# Patient Record
Sex: MALE | Race: WHITE | URBAN - METROPOLITAN AREA
[De-identification: names, ages, dates, MRNs, and addresses within clinical notes are randomized per-mention and may not be internally consistent; named-entity substitution may affect disease eponyms.]

---

## 2018-07-11 ENCOUNTER — EMERGENCY (EMERGENCY)
Facility: HOSPITAL | Age: 58
LOS: 0 days | Discharge: HOME | End: 2018-07-12
Attending: EMERGENCY MEDICINE | Admitting: EMERGENCY MEDICINE

## 2018-07-11 VITALS
HEART RATE: 79 BPM | OXYGEN SATURATION: 98 % | RESPIRATION RATE: 20 BRPM | TEMPERATURE: 97 F | DIASTOLIC BLOOD PRESSURE: 74 MMHG | SYSTOLIC BLOOD PRESSURE: 126 MMHG
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
## 2018-07-11 DIAGNOSIS — I25.10 ATHEROSCLEROTIC HEART DISEASE OF NATIVE CORONARY ARTERY WITHOUT ANGINA PECTORIS: ICD-10-CM

## 2018-07-11 DIAGNOSIS — I25.2 OLD MYOCARDIAL INFARCTION: ICD-10-CM

## 2018-07-11 DIAGNOSIS — R07.89 OTHER CHEST PAIN: ICD-10-CM

## 2018-07-11 DIAGNOSIS — Z21 ASYMPTOMATIC HUMAN IMMUNODEFICIENCY VIRUS [HIV] INFECTION STATUS: ICD-10-CM
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
## 2018-07-11 DIAGNOSIS — I10 ESSENTIAL (PRIMARY) HYPERTENSION: ICD-10-CM

## 2018-07-11 DIAGNOSIS — E78.00 PURE HYPERCHOLESTEROLEMIA, UNSPECIFIED: ICD-10-CM
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
## 2018-07-11 DIAGNOSIS — F17.200 NICOTINE DEPENDENCE, UNSPECIFIED, UNCOMPLICATED: ICD-10-CM
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
## 2018-07-11 LAB
APTT BLD: 31.6 SEC — SIGNIFICANT CHANGE UP (ref 27–39.2)
BASOPHILS # BLD AUTO: 0.05 K/UL — SIGNIFICANT CHANGE UP (ref 0–0.2)
BASOPHILS NFR BLD AUTO: 0.6 % — SIGNIFICANT CHANGE UP (ref 0–1)
CK MB CFR SERPL CALC: 2.1 NG/ML — SIGNIFICANT CHANGE UP (ref 0.6–6.3)
EOSINOPHIL # BLD AUTO: 0.1 K/UL — SIGNIFICANT CHANGE UP (ref 0–0.7)
EOSINOPHIL NFR BLD AUTO: 1.2 % — SIGNIFICANT CHANGE UP (ref 0–8)
HCT VFR BLD CALC: 40.3 % — LOW (ref 42–52)
HGB BLD-MCNC: 14.6 G/DL — SIGNIFICANT CHANGE UP (ref 14–18)
IMM GRANULOCYTES NFR BLD AUTO: 0.4 % — HIGH (ref 0.1–0.3)
INR BLD: 1.11 RATIO — SIGNIFICANT CHANGE UP (ref 0.65–1.3)
LYMPHOCYTES # BLD AUTO: 1.77 K/UL — SIGNIFICANT CHANGE UP (ref 1.2–3.4)
LYMPHOCYTES # BLD AUTO: 21.3 % — SIGNIFICANT CHANGE UP (ref 20.5–51.1)
MCHC RBC-ENTMCNC: 33 PG — HIGH (ref 27–31)
MCHC RBC-ENTMCNC: 36.2 G/DL — SIGNIFICANT CHANGE UP (ref 32–37)
MCV RBC AUTO: 91.2 FL — SIGNIFICANT CHANGE UP (ref 80–94)
MONOCYTES # BLD AUTO: 0.53 K/UL — SIGNIFICANT CHANGE UP (ref 0.1–0.6)
MONOCYTES NFR BLD AUTO: 6.4 % — SIGNIFICANT CHANGE UP (ref 1.7–9.3)
NEUTROPHILS # BLD AUTO: 5.84 K/UL — SIGNIFICANT CHANGE UP (ref 1.4–6.5)
NEUTROPHILS NFR BLD AUTO: 70.1 % — SIGNIFICANT CHANGE UP (ref 42.2–75.2)
NRBC # BLD: 0 /100 WBCS — SIGNIFICANT CHANGE UP (ref 0–0)
PLATELET # BLD AUTO: 161 K/UL — SIGNIFICANT CHANGE UP (ref 130–400)
PROTHROM AB SERPL-ACNC: 12 SEC — SIGNIFICANT CHANGE UP (ref 9.95–12.87)
RBC # BLD: 4.42 M/UL — LOW (ref 4.7–6.1)
RBC # FLD: 13 % — SIGNIFICANT CHANGE UP (ref 11.5–14.5)
TROPONIN T SERPL-MCNC: 0.01 NG/ML — SIGNIFICANT CHANGE UP
WBC # BLD: 8.32 K/UL — SIGNIFICANT CHANGE UP (ref 4.8–10.8)
WBC # FLD AUTO: 8.32 K/UL — SIGNIFICANT CHANGE UP (ref 4.8–10.8)

## 2018-07-11 NOTE — ED PROVIDER NOTE - PROGRESS NOTE DETAILS
pt feels well and wishes to leave because he has to prepare for a gathering v. staying here for admission and continued workup.  pt aware that without two troponins, I am unable to ascertain if he had an MI or any cardiac damage.  Pt will f/u with his PCP and is encouraged to return for any continued pain.

## 2018-07-11 NOTE — ED PROVIDER NOTE - NS ED ROS FT
Review of Systems:  · CONSTITUTIONAL: no fever and no chills.  · EYES: no discharge, no irritation, no pain, no redness, and no visual changes.  · ENMT: Ears: no ear pain and no hearing problems. Nose: no nasal congestion and no nasal drainage. Mouth/Throat: no dysphagia, no hoarseness and no throat pain.  · CARDIOVASCULAR: + chest pain  · RESPIRATORY: no cough, and no shortness of breath.  · GASTROINTESTINAL: no abdominal pain, no diarrhea, no nausea and no vomiting.  · GENITOURINARY: no dysuria  · MUSCULOSKELETAL: no back pain, no musculoskeletal pain, no weakness.  · SKIN: no rashes.  · NEURO: no loss of consciousness, no headache.  · ROS STATEMENT: all other ROS negative except as per HPI

## 2018-07-11 NOTE — ED PROVIDER NOTE - OBJECTIVE STATEMENT
57 y/o M with h/o CAD and MI x 3 here with chest tightness that felt the same as his prior MIs.  Felt squeezing around his chest wall.  No associated sob, n/v, diaphoresis.  has been doing heavy lifting and moving the past 2 days.  Pt started about 2 hrs PTA and lasted for about 1 hr.  PMH htn, cad, hchol, hiv.  Pt does not f/u with cardiologist.  Pt did take 2 regular strength asa as precaution the last two days.  No ap. No leg pain or swelling. No cough, fever.

## 2018-07-11 NOTE — ED PROVIDER NOTE - PHYSICAL EXAMINATION
Physical Examination:   VITAL SIGNS: I have reviewed vital signs.  CONSTITUTIONAL: well appearing, NAD.   SKIN: Skin exam is warm and dry.  No rashes  HEAD: Normocephalic; atraumatic.  EYES: PERRL, EOM intact; conjunctiva and sclera clear.  ENT: No nasal discharge; airway clear.  CARD: S1, S2 reg  RESP: CTAB. No wheezes, rales or rhonchi.  ABD: soft; non-distended; non-tender  EXT: Normal ROM. No edema.  NEURO: Alert, oriented. Grossly unremarkable. No focal deficits. Ambulatory with steady gait.  PSYCH: Cooperative, appropriate.

## 2018-07-11 NOTE — ED PROVIDER NOTE - PMH
CAD (coronary artery disease)    High cholesterol    HIV (human immunodeficiency virus infection)    HTN (hypertension)

## 2018-07-11 NOTE — ED PROVIDER NOTE - MEDICAL DECISION MAKING DETAILS
The patient has decided to leave against medical advice.  The patient is AAOx3, not intoxicated, and displays normal decision making ability. We discussed all risks, benefits, and alternatives to the progression of treatment and the potential dangers of leaving including but not limited to permanent disability, injury, and death.  The patient was instructed that they are welcome to change their decision to leave against medical advice and return to the emergency department at any time and for any reason in order to allow us to render care.

## 2018-07-11 NOTE — ED ADULT TRIAGE NOTE - CHIEF COMPLAINT QUOTE
tightness of chest started over an hour ago, patient was doing yard work today states he has 3 heart attacks in the past

## 2018-07-12 LAB
ALBUMIN SERPL ELPH-MCNC: 4.7 G/DL — SIGNIFICANT CHANGE UP (ref 3.5–5.2)
ALP SERPL-CCNC: 93 U/L — SIGNIFICANT CHANGE UP (ref 30–115)
ALT FLD-CCNC: 15 U/L — SIGNIFICANT CHANGE UP (ref 0–41)
ANION GAP SERPL CALC-SCNC: 18 MMOL/L — HIGH (ref 7–14)
AST SERPL-CCNC: 21 U/L — SIGNIFICANT CHANGE UP (ref 0–41)
BILIRUB SERPL-MCNC: 0.3 MG/DL — SIGNIFICANT CHANGE UP (ref 0.2–1.2)
BUN SERPL-MCNC: 17 MG/DL — SIGNIFICANT CHANGE UP (ref 10–20)
CALCIUM SERPL-MCNC: 9 MG/DL — SIGNIFICANT CHANGE UP (ref 8.5–10.1)
CHLORIDE SERPL-SCNC: 103 MMOL/L — SIGNIFICANT CHANGE UP (ref 98–110)
CHOLEST SERPL-MCNC: 92 MG/DL — LOW (ref 100–200)
CK SERPL-CCNC: 60 U/L — SIGNIFICANT CHANGE UP (ref 0–225)
CO2 SERPL-SCNC: 19 MMOL/L — SIGNIFICANT CHANGE UP (ref 17–32)
CREAT SERPL-MCNC: 1.1 MG/DL — SIGNIFICANT CHANGE UP (ref 0.7–1.5)
GLUCOSE SERPL-MCNC: 124 MG/DL — HIGH (ref 70–99)
POTASSIUM SERPL-MCNC: 4.6 MMOL/L — SIGNIFICANT CHANGE UP (ref 3.5–5)
POTASSIUM SERPL-SCNC: 4.6 MMOL/L — SIGNIFICANT CHANGE UP (ref 3.5–5)
PROT SERPL-MCNC: 7.3 G/DL — SIGNIFICANT CHANGE UP (ref 6–8)
SODIUM SERPL-SCNC: 140 MMOL/L — SIGNIFICANT CHANGE UP (ref 135–146)

## 2018-07-12 NOTE — ED ADULT NURSE REASSESSMENT NOTE - NS ED NURSE REASSESS COMMENT FT1
0100 pt wants to sign out ama spoken to at length by renato antony. paperwork provided. pt verbalized understanding

## 2021-11-01 NOTE — ED ADULT NURSE NOTE - TEMPLATE LIST FOR HEAD TO TOE ASSESSMENT
Lyrica Denied     Formulary reason  Prescription Drug Insurance: [employer]  Denial reason:       Appeal information:       Please reply to telephone encounter on how to proceed with medication authorization.         Cardiac

## 2022-08-10 ENCOUNTER — INPATIENT (INPATIENT)
Facility: HOSPITAL | Age: 62
LOS: 4 days | Discharge: HOME | End: 2022-08-15
Attending: STUDENT IN AN ORGANIZED HEALTH CARE EDUCATION/TRAINING PROGRAM | Admitting: STUDENT IN AN ORGANIZED HEALTH CARE EDUCATION/TRAINING PROGRAM

## 2022-08-10 VITALS
HEART RATE: 95 BPM | OXYGEN SATURATION: 97 % | TEMPERATURE: 99 F | SYSTOLIC BLOOD PRESSURE: 130 MMHG | RESPIRATION RATE: 20 BRPM | WEIGHT: 160.06 LBS | DIASTOLIC BLOOD PRESSURE: 62 MMHG

## 2022-08-10 PROBLEM — I25.10 ATHEROSCLEROTIC HEART DISEASE OF NATIVE CORONARY ARTERY WITHOUT ANGINA PECTORIS: Chronic | Status: ACTIVE | Noted: 2018-07-11

## 2022-08-10 PROBLEM — I10 ESSENTIAL (PRIMARY) HYPERTENSION: Chronic | Status: ACTIVE | Noted: 2018-07-11

## 2022-08-10 PROBLEM — B20 HUMAN IMMUNODEFICIENCY VIRUS [HIV] DISEASE: Chronic | Status: ACTIVE | Noted: 2018-07-11

## 2022-08-10 PROBLEM — E78.00 PURE HYPERCHOLESTEROLEMIA, UNSPECIFIED: Chronic | Status: ACTIVE | Noted: 2018-07-11

## 2022-08-10 LAB
ALBUMIN SERPL ELPH-MCNC: 4.9 G/DL — SIGNIFICANT CHANGE UP (ref 3.5–5.2)
ALP SERPL-CCNC: 112 U/L — SIGNIFICANT CHANGE UP (ref 30–115)
ALT FLD-CCNC: 19 U/L — SIGNIFICANT CHANGE UP (ref 0–41)
ANION GAP SERPL CALC-SCNC: 13 MMOL/L — SIGNIFICANT CHANGE UP (ref 7–14)
AST SERPL-CCNC: 27 U/L — SIGNIFICANT CHANGE UP (ref 0–41)
BASOPHILS # BLD AUTO: 0.04 K/UL — SIGNIFICANT CHANGE UP (ref 0–0.2)
BASOPHILS NFR BLD AUTO: 1 % — SIGNIFICANT CHANGE UP (ref 0–1)
BILIRUB SERPL-MCNC: 0.5 MG/DL — SIGNIFICANT CHANGE UP (ref 0.2–1.2)
BUN SERPL-MCNC: 17 MG/DL — SIGNIFICANT CHANGE UP (ref 10–20)
CALCIUM SERPL-MCNC: 9.5 MG/DL — SIGNIFICANT CHANGE UP (ref 8.5–10.1)
CHLORIDE SERPL-SCNC: 104 MMOL/L — SIGNIFICANT CHANGE UP (ref 98–110)
CO2 SERPL-SCNC: 23 MMOL/L — SIGNIFICANT CHANGE UP (ref 17–32)
CREAT SERPL-MCNC: 1.1 MG/DL — SIGNIFICANT CHANGE UP (ref 0.7–1.5)
EGFR: 75 ML/MIN/1.73M2 — SIGNIFICANT CHANGE UP
EOSINOPHIL # BLD AUTO: 0.17 K/UL — SIGNIFICANT CHANGE UP (ref 0–0.7)
EOSINOPHIL NFR BLD AUTO: 4.1 % — SIGNIFICANT CHANGE UP (ref 0–8)
FLUAV AG NPH QL: SIGNIFICANT CHANGE UP
FLUBV AG NPH QL: SIGNIFICANT CHANGE UP
GLUCOSE SERPL-MCNC: 113 MG/DL — HIGH (ref 70–99)
HCT VFR BLD CALC: 41.1 % — LOW (ref 42–52)
HGB BLD-MCNC: 14.6 G/DL — SIGNIFICANT CHANGE UP (ref 14–18)
IMM GRANULOCYTES NFR BLD AUTO: 0 % — LOW (ref 0.1–0.3)
LACTATE SERPL-SCNC: 1.2 MMOL/L — SIGNIFICANT CHANGE UP (ref 0.7–2)
LYMPHOCYTES # BLD AUTO: 1.3 K/UL — SIGNIFICANT CHANGE UP (ref 1.2–3.4)
LYMPHOCYTES # BLD AUTO: 31.6 % — SIGNIFICANT CHANGE UP (ref 20.5–51.1)
MCHC RBC-ENTMCNC: 33.8 PG — HIGH (ref 27–31)
MCHC RBC-ENTMCNC: 35.5 G/DL — SIGNIFICANT CHANGE UP (ref 32–37)
MCV RBC AUTO: 95.1 FL — HIGH (ref 80–94)
MONOCYTES # BLD AUTO: 0.61 K/UL — HIGH (ref 0.1–0.6)
MONOCYTES NFR BLD AUTO: 14.8 % — HIGH (ref 1.7–9.3)
NEUTROPHILS # BLD AUTO: 2 K/UL — SIGNIFICANT CHANGE UP (ref 1.4–6.5)
NEUTROPHILS NFR BLD AUTO: 48.5 % — SIGNIFICANT CHANGE UP (ref 42.2–75.2)
NRBC # BLD: 0 /100 WBCS — SIGNIFICANT CHANGE UP (ref 0–0)
NT-PROBNP SERPL-SCNC: 85 PG/ML — SIGNIFICANT CHANGE UP (ref 0–300)
PLATELET # BLD AUTO: 157 K/UL — SIGNIFICANT CHANGE UP (ref 130–400)
POTASSIUM SERPL-MCNC: 4.8 MMOL/L — SIGNIFICANT CHANGE UP (ref 3.5–5)
POTASSIUM SERPL-SCNC: 4.8 MMOL/L — SIGNIFICANT CHANGE UP (ref 3.5–5)
PROT SERPL-MCNC: 7.9 G/DL — SIGNIFICANT CHANGE UP (ref 6–8)
RBC # BLD: 4.32 M/UL — LOW (ref 4.7–6.1)
RBC # FLD: 13.7 % — SIGNIFICANT CHANGE UP (ref 11.5–14.5)
RSV RNA NPH QL NAA+NON-PROBE: SIGNIFICANT CHANGE UP
SARS-COV-2 RNA SPEC QL NAA+PROBE: DETECTED
SODIUM SERPL-SCNC: 140 MMOL/L — SIGNIFICANT CHANGE UP (ref 135–146)
TROPONIN T SERPL-MCNC: <0.01 NG/ML — SIGNIFICANT CHANGE UP
WBC # BLD: 4.12 K/UL — LOW (ref 4.8–10.8)
WBC # FLD AUTO: 4.12 K/UL — LOW (ref 4.8–10.8)

## 2022-08-10 PROCEDURE — 93010 ELECTROCARDIOGRAM REPORT: CPT

## 2022-08-10 PROCEDURE — 71045 X-RAY EXAM CHEST 1 VIEW: CPT | Mod: 26

## 2022-08-10 PROCEDURE — 99285 EMERGENCY DEPT VISIT HI MDM: CPT

## 2022-08-10 RX ORDER — EMTRICITABINE AND TENOFOVIR DISOPROXIL FUMARATE 200; 300 MG/1; MG/1
1 TABLET, FILM COATED ORAL DAILY
Refills: 0 | Status: DISCONTINUED | OUTPATIENT
Start: 2022-08-10 | End: 2022-08-15

## 2022-08-10 RX ORDER — ENOXAPARIN SODIUM 100 MG/ML
40 INJECTION SUBCUTANEOUS EVERY 24 HOURS
Refills: 0 | Status: DISCONTINUED | OUTPATIENT
Start: 2022-08-10 | End: 2022-08-15

## 2022-08-10 RX ORDER — ATORVASTATIN CALCIUM 80 MG/1
1 TABLET, FILM COATED ORAL
Qty: 0 | Refills: 0 | DISCHARGE

## 2022-08-10 RX ORDER — TENOFOVIR DISOPROXIL FUMARATE 300 MG/1
300 TABLET, FILM COATED ORAL DAILY
Refills: 0 | Status: DISCONTINUED | OUTPATIENT
Start: 2022-08-10 | End: 2022-08-10

## 2022-08-10 RX ORDER — NICOTINE POLACRILEX 2 MG
1 GUM BUCCAL DAILY
Refills: 0 | Status: DISCONTINUED | OUTPATIENT
Start: 2022-08-10 | End: 2022-08-15

## 2022-08-10 RX ORDER — EMTRICITABINE, RILPIVIRINE HYDROCHLORIDE, AND TENOFOVIR DISOPROXIL FUMARATE 200; 25; 300 MG/1; MG/1; MG/1
1 TABLET, FILM COATED ORAL
Refills: 0 | Status: DISCONTINUED | OUTPATIENT
Start: 2022-08-10 | End: 2022-08-10

## 2022-08-10 RX ORDER — ASPIRIN/CALCIUM CARB/MAGNESIUM 324 MG
81 TABLET ORAL DAILY
Refills: 0 | Status: DISCONTINUED | OUTPATIENT
Start: 2022-08-10 | End: 2022-08-15

## 2022-08-10 RX ORDER — LISINOPRIL 2.5 MG/1
1 TABLET ORAL
Qty: 0 | Refills: 0 | DISCHARGE

## 2022-08-10 RX ORDER — RILPIVIRINE HYDROCHLORIDE 25 MG/1
25 TABLET, FILM COATED ORAL
Refills: 0 | Status: DISCONTINUED | OUTPATIENT
Start: 2022-08-10 | End: 2022-08-15

## 2022-08-10 RX ORDER — ASPIRIN/CALCIUM CARB/MAGNESIUM 324 MG
1 TABLET ORAL
Qty: 0 | Refills: 0 | DISCHARGE

## 2022-08-10 RX ORDER — LISINOPRIL 2.5 MG/1
10 TABLET ORAL DAILY
Refills: 0 | Status: DISCONTINUED | OUTPATIENT
Start: 2022-08-10 | End: 2022-08-15

## 2022-08-10 RX ORDER — CHLORHEXIDINE GLUCONATE 213 G/1000ML
1 SOLUTION TOPICAL
Refills: 0 | Status: DISCONTINUED | OUTPATIENT
Start: 2022-08-10 | End: 2022-08-15

## 2022-08-10 RX ORDER — ACETAMINOPHEN 500 MG
650 TABLET ORAL EVERY 6 HOURS
Refills: 0 | Status: DISCONTINUED | OUTPATIENT
Start: 2022-08-10 | End: 2022-08-15

## 2022-08-10 RX ORDER — AZITHROMYCIN 500 MG/1
250 TABLET, FILM COATED ORAL DAILY
Refills: 0 | Status: DISCONTINUED | OUTPATIENT
Start: 2022-08-10 | End: 2022-08-15

## 2022-08-10 RX ORDER — ATORVASTATIN CALCIUM 80 MG/1
20 TABLET, FILM COATED ORAL AT BEDTIME
Refills: 0 | Status: DISCONTINUED | OUTPATIENT
Start: 2022-08-10 | End: 2022-08-15

## 2022-08-10 RX ORDER — EMTRICITABINE, RILPIVIRINE HYDROCHLORIDE, AND TENOFOVIR DISOPROXIL FUMARATE 200; 25; 300 MG/1; MG/1; MG/1
1 TABLET, FILM COATED ORAL
Qty: 0 | Refills: 0 | DISCHARGE

## 2022-08-10 RX ORDER — IPRATROPIUM/ALBUTEROL SULFATE 18-103MCG
3 AEROSOL WITH ADAPTER (GRAM) INHALATION
Refills: 0 | Status: COMPLETED | OUTPATIENT
Start: 2022-08-10 | End: 2022-08-10

## 2022-08-10 RX ADMIN — Medication 3 MILLILITER(S): at 10:08

## 2022-08-10 RX ADMIN — Medication 3 MILLILITER(S): at 14:00

## 2022-08-10 RX ADMIN — Medication 3 MILLILITER(S): at 18:12

## 2022-08-10 RX ADMIN — Medication 100 MILLIGRAM(S): at 22:09

## 2022-08-10 RX ADMIN — Medication 125 MILLIGRAM(S): at 10:08

## 2022-08-10 NOTE — H&P ADULT - NSHPREVIEWOFSYSTEMS_GEN_ALL_CORE

## 2022-08-10 NOTE — H&P ADULT - HISTORY OF PRESENT ILLNESS
63 yo M PMHx HIV undetectable, CAD s/p PCI _____, HTN, DLD, current smoker presents to ED c/o SOB x 3ms.      Patient states in the last 2 weeks has had cough and chills.  Patient states he is vaccinated.  No chest pain, leg pain, leg swelling, abdominal pain, nausea, vomiting, diarrhea or urinary symptoms    In the ED, VS wnl, T98.9 HR: 95 BP: 130/62 RR: 20 SpO2: 97% on RA. Labs notable for COVID- 19 (+). CXR w/o acute cardiopulm disease. Pt is being admitted for SOB.     Med list obtained from _______           63 yo M PMHx HIV undetectable, CAD s/p PCI x1 2006, HTN, DLD, current smoker presents to ED c/o SOB x 3ms.      Patient states that he began to feel mild sob, mainly with exertion, decided to cut down on cigarets to ~ 6 per day, but sob c/w to worsen. One month ago sob was present at rest and exertion, and was a/w product cough with greenish sputum and subjective f/c. Patient states he is vaccinated for covid x2 and booted x1 (2/22). Denies, fatigue, dizziness, H/A, chest pain, palpitations, n/v/d/c or urinary symptoms. Says he is compliant with HIV meds and has good OP f/u with ID.     In the ED, VS wnl, T 98.9F HR: 95 BP: 130/62 RR: 20 SpO2: 97% on RA. Labs notable for COVID- 19 (+). CXR w/o acute cardiopulm disease. Pt is being admitted for SOB.     Med list obtained from pt.            63 yo M PMHx HIV undetectable, CAD s/p PCI x1 2006, HTN, DLD, current smoker presents to ED c/o SOB x 3ms.      Patient states that he began to feel mild sob, mainly with exertion, decided to cut down on cigarets to ~ 6 per day, but sob c/w to worsen. One month ago sob was present at rest and exertion, and was a/w product cough with greenish sputum and subjective f/c. Patient states he is vaccinated for covid x2 and booted x1 (2/22). Denies, fatigue, dizziness, H/A, chest pain, palpitations, n/v/d/c or urinary symptoms. Says he is compliant with HIV meds and has good OP f/u with ID.     In the ED, VS wnl, T 98.9F HR: 95 BP: 130/62 RR: 20 SpO2: 97% on RA. Labs notable for COVID- 19 (+). CXR w/o acute cardiopulm disease. Pt received solumedrol 125mg x1 in the ED. Being admitted for SOB.     Med list obtained from pt.

## 2022-08-10 NOTE — ED PROVIDER NOTE - OBJECTIVE STATEMENT
62-year-old male with history of HIV undetectable, CAD status post stents, hypertension, high cholesterol, smoker presents to ED complaining of shortness of breath for 3 months.  Patient states in the last 2 weeks has had cough and chills.  Patient states he is vaccinated.  No chest pain, leg pain, leg swelling, abdominal pain, nausea, vomiting, diarrhea or urinary symptoms.

## 2022-08-10 NOTE — ED PROVIDER NOTE - NSICDXPASTMEDICALHX_GEN_ALL_CORE_FT
PAST MEDICAL HISTORY:  CAD (coronary artery disease)     High cholesterol     HIV (human immunodeficiency virus infection)     HTN (hypertension)

## 2022-08-10 NOTE — ED PROVIDER NOTE - NS ED ATTENDING STATEMENT MOD
This was a shared visit with the SADA. I reviewed and verified the documentation and independently performed the documented:

## 2022-08-10 NOTE — H&P ADULT - NSHPPHYSICALEXAM_GEN_ALL_CORE
PHYSICAL EXAM:  GENERAL: NAD, lying in bed comfortably  HEAD:  Atraumatic, Normocephalic  EYES: EOMI, PERRLA, conjunctiva and sclera clear  ENT: Moist mucous membranes  NECK: Supple, No JVD  CHEST/LUNG: Clear to auscultation bilaterally; No rales, rhonchi, wheezing, or rubs. Unlabored respirations  HEART: Regular rate and rhythm; No murmurs, rubs, or gallops  ABDOMEN: Bowel sounds present; Soft, Nontender, Nondistended. No hepatomegally  EXTREMITIES:  2+ Peripheral Pulses, brisk capillary refill. No clubbing, cyanosis, or edema  NERVOUS SYSTEM:  Alert & Oriented X3, speech clear. No deficits   MSK: FROM all 4 extremities, full and equal strength  SKIN: No rashes or lesions PHYSICAL EXAM:  GENERAL: (+) coughing during interview, (+) sounds congested, lying in bed comfortably  HEAD:  Atraumatic, Normocephalic  EYES: EOMI, PERRLA, conjunctiva and sclera clear  ENT: Moist mucous membranes  NECK: Supple, No JVD  CHEST/LUNG: Clear to auscultation bilaterally; No rales, rhonchi, wheezing, or rubs. Unlabored respirations  HEART: Regular rate and rhythm; No murmurs, rubs, or gallops  ABDOMEN: Bowel sounds present; Soft, Nontender, Nondistended  EXTREMITIES:  2+ Peripheral Pulses, brisk capillary refill. No clubbing, cyanosis, or edema  NERVOUS SYSTEM:  Alert & Oriented X3, speech clear. No deficits   MSK: FROM all 4 extremities, full and equal strength  SKIN: No rashes or lesions

## 2022-08-10 NOTE — ED ADULT TRIAGE NOTE - CHIEF COMPLAINT QUOTE
Patient c/o of SOB that has been progressively worse for the last 2 months. More recently patient developed fevers and productive cough

## 2022-08-10 NOTE — ED PROVIDER NOTE - CLINICAL SUMMARY MEDICAL DECISION MAKING FREE TEXT BOX
pt w/ some improvement after treatment of copd/wheezing. labs reassuring. will admit for further treatment/monitoring of copd exacerbation

## 2022-08-10 NOTE — H&P ADULT - ASSESSMENT
# COVID-19 PNA    # HIV    # CAD s/p PCI ____    # HTN    # DLD     DVT ppx: Lovenox   GI ppx: None  Diet: DASH  Activity: IAT  Dispo: Admit to med  61 yo M PMHx HIV undetectable, CAD s/p PCI x1 2006, HTN, DLD, current smoker presents to ED c/o SOB x 3ms.      # COVID-19 PNA  # Suspected acute bronchitis   - Vaccinated with Pfizer x2 (2021), booster x1 (2/2022)  - Persistent cough, sob x3wks  - Breathing RA , no wheezing  - CXR unremarkable  - RVP, influenza neg   - S/p Solumedrol 125mg iv x1 in ED   - Azithro 250mg po qd  - Prednisone 40mg po qd   - Tessalon perle 100mg po tid   - Duonebs PRN   - Isolation precautions   - F/u baseline inflammatory markers, procal, bl cx, sputum cx  - F/u ID c/s     # HIV unknown CD4 count  - Reportedly undetectable VL  - C/w Complera   - F/u CD4, HIV VL    # CAD s/p PCI x1 2006  - C/w ASA, statin, ACE    # HTN  - Lisinopril 10mg po qd     # DLD   - Atorvastatin 20mg po qd    DVT ppx: Lovenox   GI ppx: None  Diet: DASH  Activity: IAT  Dispo: Admit to med  61 yo M PMHx HIV undetectable, CAD s/p PCI x1 2006, HTN, DLD, current smoker presents to ED c/o SOB x 3ms.      # COVID-19 PNA  # Suspected acute bronchitis  - Possibly due to tobacco use vs covid-19 sequelae    - Vaccinated with Pfizer x2 (2021), booster x1 (2/2022)  - Breathing RA , no wheezing  - CXR unremarkable  - RVP, influenza neg   - S/p Solumedrol 125mg iv x1 in ED   - Azithro 250mg po qd  - Prednisone 40mg po qd   - Tessalon perle 100mg po tid   - Duonebs PRN   - Isolation precautions   - F/u baseline inflammatory markers, procal, bl cx, sputum cx  - F/u ID c/s     # HIV unknown CD4 count  - Reportedly undetectable VL  - C/w Complera  - C/w Azithromycin   - F/u CD4, HIV VL    # CAD s/p PCI x1 2006  - C/w ASA, statin, ACE    # HTN  - Lisinopril 10mg po qd     # DLD   - Atorvastatin 20mg po qd    # Tobacco use  - Nicotine patch     DVT ppx: Lovenox   GI ppx: None  Diet: DASH  Activity: IAT  Dispo: Admit to med

## 2022-08-10 NOTE — ED PROVIDER NOTE - ATTENDING APP SHARED VISIT CONTRIBUTION OF CARE
62-year-old male with history of HIV undetectable, CAD status post stents, hypertension, high cholesterol, smoker presents to ED for evaluation of progressive SOB x 3 months. pt states sx worsened over the past 2 weeks. pt states he gets episodes of severe SOB where he feels very SOB.  pt states worsening SOB accompanied by cough and chills. no cp, leg pain, leg swelling, ap, n,v,d,dysuria, rash.  pt has not followed up w/ pcp recently.    vss  gen- NAD, aaox3  card-rrr  lungs-no resp distress, diffuse wheezing  abd-sntnd, no guarding or rebound  neuro- full str/sensation, cn ii-xii grossly intact, normal coordination    c/f copd, r/o acs, r/o infection/pna/viral/covid, r/o anemia  labs, cxr, ekg, trop, will provide supportive care, serial exam and ED observation period

## 2022-08-11 LAB
4/8 RATIO: 0.21 RATIO — LOW (ref 1.2–3.4)
ABS CD8: 271 /UL — SIGNIFICANT CHANGE UP (ref 206–494)
ALBUMIN SERPL ELPH-MCNC: 4.4 G/DL — SIGNIFICANT CHANGE UP (ref 3.5–5.2)
ALBUMIN SERPL ELPH-MCNC: 4.5 G/DL — SIGNIFICANT CHANGE UP (ref 3.5–5.2)
ALP SERPL-CCNC: 106 U/L — SIGNIFICANT CHANGE UP (ref 30–115)
ALP SERPL-CCNC: 99 U/L — SIGNIFICANT CHANGE UP (ref 30–115)
ALT FLD-CCNC: 18 U/L — SIGNIFICANT CHANGE UP (ref 0–41)
ALT FLD-CCNC: 18 U/L — SIGNIFICANT CHANGE UP (ref 0–41)
ANION GAP SERPL CALC-SCNC: 9 MMOL/L — SIGNIFICANT CHANGE UP (ref 7–14)
AST SERPL-CCNC: 20 U/L — SIGNIFICANT CHANGE UP (ref 0–41)
AST SERPL-CCNC: 21 U/L — SIGNIFICANT CHANGE UP (ref 0–41)
BASOPHILS # BLD AUTO: 0 K/UL — SIGNIFICANT CHANGE UP (ref 0–0.2)
BASOPHILS NFR BLD AUTO: 0 % — SIGNIFICANT CHANGE UP (ref 0–1)
BILIRUB DIRECT SERPL-MCNC: <0.2 MG/DL — SIGNIFICANT CHANGE UP (ref 0–0.3)
BILIRUB INDIRECT FLD-MCNC: >0.1 MG/DL — LOW (ref 0.2–1.2)
BILIRUB SERPL-MCNC: 0.3 MG/DL — SIGNIFICANT CHANGE UP (ref 0.2–1.2)
BILIRUB SERPL-MCNC: 0.5 MG/DL — SIGNIFICANT CHANGE UP (ref 0.2–1.2)
BUN SERPL-MCNC: 21 MG/DL — HIGH (ref 10–20)
CALCIUM SERPL-MCNC: 9.5 MG/DL — SIGNIFICANT CHANGE UP (ref 8.5–10.1)
CD16+CD56+ CELLS NFR BLD: 25 % — HIGH (ref 4–20)
CD16+CD56+ CELLS NFR SPEC: 119 /UL — SIGNIFICANT CHANGE UP (ref 89–385)
CD19 BLASTS SPEC-ACNC: 27 /UL — LOW (ref 72–502)
CD19 BLASTS SPEC-ACNC: 6 % — LOW (ref 10–28)
CD3 BLASTS SPEC-ACNC: 332 /UL — LOW (ref 800–2012)
CD3 BLASTS SPEC-ACNC: 69 % — SIGNIFICANT CHANGE UP (ref 59–81)
CD4 %: 12 % — LOW (ref 42–58)
CD8 %: 56 % — HIGH (ref 14–30)
CHLORIDE SERPL-SCNC: 103 MMOL/L — SIGNIFICANT CHANGE UP (ref 98–110)
CO2 SERPL-SCNC: 26 MMOL/L — SIGNIFICANT CHANGE UP (ref 17–32)
CREAT SERPL-MCNC: 0.8 MG/DL — SIGNIFICANT CHANGE UP (ref 0.7–1.5)
CREAT SERPL-MCNC: 0.9 MG/DL — SIGNIFICANT CHANGE UP (ref 0.7–1.5)
CRP SERPL-MCNC: 3.3 MG/L — SIGNIFICANT CHANGE UP
EGFR: 95 ML/MIN/1.73M2 — SIGNIFICANT CHANGE UP
EGFR: 99 ML/MIN/1.73M2 — SIGNIFICANT CHANGE UP
EOSINOPHIL # BLD AUTO: 0.01 K/UL — SIGNIFICANT CHANGE UP (ref 0–0.7)
EOSINOPHIL NFR BLD AUTO: 0.1 % — SIGNIFICANT CHANGE UP (ref 0–8)
ERYTHROCYTE [SEDIMENTATION RATE] IN BLOOD: 20 MM/HR — HIGH (ref 0–10)
GLUCOSE SERPL-MCNC: 121 MG/DL — HIGH (ref 70–99)
HCT VFR BLD CALC: 39.4 % — LOW (ref 42–52)
HCV AB S/CO SERPL IA: 29.65 COI — HIGH
HCV AB SERPL-IMP: REACTIVE
HGB BLD-MCNC: 13.5 G/DL — LOW (ref 14–18)
IMM GRANULOCYTES NFR BLD AUTO: 0.3 % — SIGNIFICANT CHANGE UP (ref 0.1–0.3)
INR BLD: 1.04 RATIO — SIGNIFICANT CHANGE UP (ref 0.65–1.3)
IRON SATN MFR SERPL: 104 UG/DL — SIGNIFICANT CHANGE UP (ref 35–150)
IRON SATN MFR SERPL: 31 % — SIGNIFICANT CHANGE UP (ref 15–50)
LYMPHOCYTES # BLD AUTO: 1.21 K/UL — SIGNIFICANT CHANGE UP (ref 1.2–3.4)
LYMPHOCYTES # BLD AUTO: 17.9 % — LOW (ref 20.5–51.1)
MAGNESIUM SERPL-MCNC: 2.3 MG/DL — SIGNIFICANT CHANGE UP (ref 1.8–2.4)
MCHC RBC-ENTMCNC: 32.8 PG — HIGH (ref 27–31)
MCHC RBC-ENTMCNC: 34.3 G/DL — SIGNIFICANT CHANGE UP (ref 32–37)
MCV RBC AUTO: 95.9 FL — HIGH (ref 80–94)
MONOCYTES # BLD AUTO: 0.6 K/UL — SIGNIFICANT CHANGE UP (ref 0.1–0.6)
MONOCYTES NFR BLD AUTO: 8.9 % — SIGNIFICANT CHANGE UP (ref 1.7–9.3)
NEUTROPHILS # BLD AUTO: 4.93 K/UL — SIGNIFICANT CHANGE UP (ref 1.4–6.5)
NEUTROPHILS NFR BLD AUTO: 72.8 % — SIGNIFICANT CHANGE UP (ref 42.2–75.2)
NRBC # BLD: 0 /100 WBCS — SIGNIFICANT CHANGE UP (ref 0–0)
NT-PROBNP SERPL-SCNC: 230 PG/ML — SIGNIFICANT CHANGE UP (ref 0–300)
PLATELET # BLD AUTO: 148 K/UL — SIGNIFICANT CHANGE UP (ref 130–400)
POTASSIUM SERPL-MCNC: 5 MMOL/L — SIGNIFICANT CHANGE UP (ref 3.5–5)
POTASSIUM SERPL-SCNC: 5 MMOL/L — SIGNIFICANT CHANGE UP (ref 3.5–5)
PROT SERPL-MCNC: 7 G/DL — SIGNIFICANT CHANGE UP (ref 6–8)
PROT SERPL-MCNC: 7.4 G/DL — SIGNIFICANT CHANGE UP (ref 6–8)
PROTHROM AB SERPL-ACNC: 11.9 SEC — SIGNIFICANT CHANGE UP (ref 9.95–12.87)
RBC # BLD: 4.11 M/UL — LOW (ref 4.7–6.1)
RBC # FLD: 13.4 % — SIGNIFICANT CHANGE UP (ref 11.5–14.5)
SODIUM SERPL-SCNC: 138 MMOL/L — SIGNIFICANT CHANGE UP (ref 135–146)
T-CELL CD4 SUBSET PNL BLD: 57 /UL — LOW (ref 495–1312)
TIBC SERPL-MCNC: 331 UG/DL — SIGNIFICANT CHANGE UP (ref 220–430)
UIBC SERPL-MCNC: 227 UG/DL — SIGNIFICANT CHANGE UP (ref 110–370)
WBC # BLD: 6.77 K/UL — SIGNIFICANT CHANGE UP (ref 4.8–10.8)
WBC # FLD AUTO: 6.77 K/UL — SIGNIFICANT CHANGE UP (ref 4.8–10.8)

## 2022-08-11 PROCEDURE — 99223 1ST HOSP IP/OBS HIGH 75: CPT

## 2022-08-11 PROCEDURE — 71250 CT THORAX DX C-: CPT | Mod: 26

## 2022-08-11 RX ORDER — REMDESIVIR 5 MG/ML
INJECTION INTRAVENOUS
Refills: 0 | Status: COMPLETED | OUTPATIENT
Start: 2022-08-11 | End: 2022-08-13

## 2022-08-11 RX ORDER — TIOTROPIUM BROMIDE 18 UG/1
1 CAPSULE ORAL; RESPIRATORY (INHALATION) DAILY
Refills: 0 | Status: COMPLETED | OUTPATIENT
Start: 2022-08-11 | End: 2023-07-10

## 2022-08-11 RX ORDER — IPRATROPIUM/ALBUTEROL SULFATE 18-103MCG
3 AEROSOL WITH ADAPTER (GRAM) INHALATION EVERY 4 HOURS
Refills: 0 | Status: COMPLETED | OUTPATIENT
Start: 2022-08-11 | End: 2022-08-11

## 2022-08-11 RX ORDER — ALBUTEROL 90 UG/1
1 AEROSOL, METERED ORAL
Refills: 0 | Status: DISCONTINUED | OUTPATIENT
Start: 2022-08-11 | End: 2022-08-15

## 2022-08-11 RX ORDER — ALBUTEROL 90 UG/1
2.5 AEROSOL, METERED ORAL ONCE
Refills: 0 | Status: COMPLETED | OUTPATIENT
Start: 2022-08-11 | End: 2022-08-15

## 2022-08-11 RX ORDER — REMDESIVIR 5 MG/ML
200 INJECTION INTRAVENOUS EVERY 24 HOURS
Refills: 0 | Status: COMPLETED | OUTPATIENT
Start: 2022-08-11 | End: 2022-08-11

## 2022-08-11 RX ORDER — ALBUTEROL 90 UG/1
1 AEROSOL, METERED ORAL
Refills: 0 | Status: COMPLETED | OUTPATIENT
Start: 2022-08-11 | End: 2023-07-10

## 2022-08-11 RX ORDER — REMDESIVIR 5 MG/ML
100 INJECTION INTRAVENOUS EVERY 24 HOURS
Refills: 0 | Status: COMPLETED | OUTPATIENT
Start: 2022-08-12 | End: 2022-08-13

## 2022-08-11 RX ORDER — IPRATROPIUM/ALBUTEROL SULFATE 18-103MCG
3 AEROSOL WITH ADAPTER (GRAM) INHALATION EVERY 4 HOURS
Refills: 0 | Status: DISCONTINUED | OUTPATIENT
Start: 2022-08-11 | End: 2022-08-11

## 2022-08-11 RX ADMIN — ALBUTEROL 1 PUFF(S): 90 AEROSOL, METERED ORAL at 23:57

## 2022-08-11 RX ADMIN — Medication 100 MILLIGRAM(S): at 17:38

## 2022-08-11 RX ADMIN — ENOXAPARIN SODIUM 40 MILLIGRAM(S): 100 INJECTION SUBCUTANEOUS at 05:12

## 2022-08-11 RX ADMIN — Medication 40 MILLIGRAM(S): at 22:27

## 2022-08-11 RX ADMIN — Medication 40 MILLIGRAM(S): at 05:12

## 2022-08-11 RX ADMIN — Medication 100 MILLIGRAM(S): at 22:27

## 2022-08-11 RX ADMIN — Medication 3 MILLILITER(S): at 08:30

## 2022-08-11 RX ADMIN — AZITHROMYCIN 250 MILLIGRAM(S): 500 TABLET, FILM COATED ORAL at 11:55

## 2022-08-11 RX ADMIN — Medication 1 PATCH: at 11:58

## 2022-08-11 RX ADMIN — REMDESIVIR 200 MILLIGRAM(S): 5 INJECTION INTRAVENOUS at 22:27

## 2022-08-11 RX ADMIN — LISINOPRIL 10 MILLIGRAM(S): 2.5 TABLET ORAL at 05:12

## 2022-08-11 RX ADMIN — Medication 100 MILLIGRAM(S): at 05:12

## 2022-08-11 RX ADMIN — Medication 81 MILLIGRAM(S): at 11:56

## 2022-08-11 NOTE — CONSULT NOTE ADULT - SUBJECTIVE AND OBJECTIVE BOX
SUSANNAMARY EVANS  62y, Male  Allergy: No Known Allergies      CHIEF COMPLAINT: SOB (10 Aug 2022 15:44)      LOS  1d    HPI:  63 yo M PMHx HIV undetectable, CAD s/p PCI x1 2006, HTN, DLD, current smoker presents to ED c/o SOB x 3ms.      Patient states that he began to feel mild sob, mainly with exertion, decided to cut down on cigarets to ~ 6 per day, but sob c/w to worsen. One month ago sob was present at rest and exertion, and was a/w product cough with greenish sputum and subjective f/c. Patient states he is vaccinated for covid x2 and booted x1 (2/22). Denies, fatigue, dizziness, H/A, chest pain, palpitations, n/v/d/c or urinary symptoms. Says he is compliant with HIV meds and has good OP f/u with ID.    In the ED, VS wnl, T 98.9F HR: 95 BP: 130/62 RR: 20 SpO2: 97% on RA. Labs notable for COVID- 19 (+). CXR w/o acute cardiopulm disease. Pt received solumedrol 125mg x1 in the ED. Being admitted for SOB.    Med list obtained from pt.           (10 Aug 2022 15:44)      INFECTIOUS DISEASE HISTORY:  History as above  Chronic shortness of breath but acute worsening in the past week.       PAST MEDICAL & SURGICAL HISTORY:  CAD (coronary artery disease)      HTN (hypertension)      High cholesterol      HIV (human immunodeficiency virus infection)      No significant past surgical history          FAMILY HISTORY  Family Hx reviewed and non-contributory    SOCIAL HISTORY  Social History:  Lives alone at private residence, 1/2 pack tobacco (10 Aug 2022 15:44)        ROS  General: Denies rigors, nightsweats  HEENT: Denies headache, rhinorrhea, sore throat, eye pain  CV: Denies CP, palpitations  PULM: Denies wheezing, hemoptysis  GI: Denies hematemesis, hematochezia, melena  : Denies discharge, hematuria  MSK: Denies arthralgias, myalgias  SKIN: Denies rash, lesions  NEURO: Denies paresthesias, weakness  PSYCH: Denies depression, anxiety    VITALS:  T(F): 96.8, Max: 98.9 (08-10-22 @ 08:00)  HR: 91  BP: 114/61  RR: 18Vital Signs Last 24 Hrs  T(C): 36 (10 Aug 2022 23:35), Max: 37.2 (10 Aug 2022 08:00)  T(F): 96.8 (10 Aug 2022 23:35), Max: 98.9 (10 Aug 2022 08:00)  HR: 91 (10 Aug 2022 23:35) (70 - 95)  BP: 114/61 (10 Aug 2022 23:35) (114/61 - 142/66)  BP(mean): 98 (10 Aug 2022 21:37) (98 - 98)  RR: 18 (10 Aug 2022 23:35) (18 - 20)  SpO2: 97% (10 Aug 2022 21:37) (95% - 97%)    Parameters below as of 10 Aug 2022 21:37  Patient On (Oxygen Delivery Method): room air        PHYSICAL EXAM:  Gen: NAD, resting in bed  HEENT: Normocephalic, atraumatic  Neck: supple, no lymphadenopathy  CV: Regular rate & regular rhythm  Lungs: decreased BS at bases, no fremitus  Abdomen: Soft, BS present  Ext: Warm, well perfused  Neuro: non focal, awake  Skin: no rash, no erythema  Lines: no phlebitis    TESTS & MEASUREMENTS:                        14.6  4.12  )-----------( 157      ( 10 Aug 2022 09:30 )             41.1    08-10    140  |  104  |  17  ----------------------------<  113<H>  4.8   |  23  |  1.1    Ca    9.5      10 Aug 2022 09:15    TPro  7.9  /  Alb  4.9  /  TBili  0.5  /  DBili  x   /  AST  27  /  ALT  19  /  AlkPhos  112  08-10      LIVER FUNCTIONS - ( 10 Aug 2022 09:15 )  Alb: 4.9 g/dL / Pro: 7.9 g/dL / ALK PHOS: 112 U/L / ALT: 19 U/L / AST: 27 U/L / GGT: x                Lactate, Blood: 1.2 mmol/L (08-10-22 @ 09:15)      INFECTIOUS DISEASES TESTING      RADIOLOGY & ADDITIONAL TESTS:  I have personally reviewed the last Chest xray  CXR  Xray Chest 1 View- PORTABLE-Urgent:  ACC: [id number] EXAM:  XR CHEST PORTABLE URGENT 1V                          PROCEDURE DATE:  08/10/2022          INTERPRETATION:  Clinical History / Reason for exam: Shortness of breath.    Comparison : Chest radiograph 7/11/2018.    Technique/Positioning: Frontal views of the chest.    Findings:    Support devices: None.    Cardiac/mediastinum/hilum: Unremarkable.    Lung parenchyma/Pleura: Within normal limits.    Skeleton/soft tissues: Minimal thoracic spondylosis    Impression:    No radiographic evidence of acute cardiopulmonary disease.        --- End of Report ---            SOFÍA ESCALANTE MD; Attending Radiologist  This document has been electronically signed. Aug 10 2022 11:56AM (08-10-22 @ 11:00)      CT      CARDIOLOGY TESTING  12 Lead ECG:  Ventricular Rate 94 BPM    Atrial Rate 94 BPM    P-R Interval 134 ms    QRS Duration 80 ms    Q-T Interval 338 ms    QTC Calculation(Bazett) 422 ms    P Axis 79 degrees    R Axis 70 degrees    T Axis 68 degrees    Diagnosis Line Sinus rhythm with occasional Premature ventricular complexes  Otherwise normal ECG    Confirmed by MARCOS BUSH MD ([id number]) on 8/10/2022 10:06:35 AM (08-10-22 @ 08:08)      MEDICATIONS  aspirin enteric coated 81 Oral daily  atorvastatin 20 Oral at bedtime  azithromycin   Tablet 250 Oral daily  benzonatate 100 Oral three times a day  chlorhexidine 4% Liquid 1 Topical <User Schedule>  emtricitabine 200 mG/tenofovir 300 mG (TRUVADA) 1 Oral daily  enoxaparin Injectable 40 SubCutaneous every 24 hours  lisinopril 10 Oral daily  nicotine -  14 mG/24Hr(s) Patch 1 Transdermal daily  predniSONE   Tablet 40 Oral daily  rilpivirine 25 Oral with dinner      Weight  Weight (kg): 72.6 (08-10-22 @ 08:00)    ANTIBIOTICS:  azithromycin   Tablet 250 milliGRAM(s) Oral daily  emtricitabine 200 mG/tenofovir 300 mG (TRUVADA) 1 Tablet(s) Oral daily  rilpivirine 25 milliGRAM(s) Oral with dinner      ALLERGIES:  No Known Allergies  
PULMONARY SERVICE INITIAL CONSULT NOTE    HPI:  63 yo M PMHx HIV undetectable, CAD s/p PCI x1 2006, HTN, DLD, current smoker presents to ED c/o SOB x 3ms.      Patient states that he began to feel mild sob, mainly with exertion, decided to cut down on cigarets to ~ 6 per day, but sob c/w to worsen. One month ago sob was present at rest and exertion, and was a/w product cough with greenish sputum and subjective f/c. Patient states he is vaccinated for covid x2 and booted x1 (2/22). Denies, fatigue, dizziness, H/A, chest pain, palpitations, n/v/d/c or urinary symptoms. Says he is compliant with HIV meds and has good OP f/u with ID.     In the ED, VS wnl, T 98.9F HR: 95 BP: 130/62 RR: 20 SpO2: 97% on RA. Labs notable for COVID- 19 (+). CXR w/o acute cardiopulm disease. Pt received solumedrol 125mg x1 in the ED. Being admitted for SOB.     Med list obtained from pt.            (10 Aug 2022 15:44)      REVIEW OF SYSTEMS:  Constitutional: No fever, weight loss or fatigue  Eyes: No eye pain, visual disturbances, or discharge  ENMT:  No difficulty hearing, tinnitus, vertigo; No sinus or throat pain  Neck: No pain, stiffness or neck swelling  Respiratory: see HPI  Cardiovascular: No chest pain, palpitations, dizziness or leg swelling  Gastrointestinal: No abdominal or epigastric pain. No nausea, vomiting or hematemesis; No diarrhea or constipation. No melena or hematochezia.  Genitourinary: No dysuria, frequency, hematuria or incontinence  Neurological: No headaches, memory loss, loss of strength, numbness or tremors  Skin: No itching, burning, rashes or lesions   Lymph Nodes: No enlarged glands  Endocrine: No heat or cold intolerance; No hair loss  Musculoskeletal: No joint pain or swelling; No muscle, back or extremity pain  Psychiatric: No depression, anxiety, mood swings or difficulty sleeping  Heme/Lymph: No easy bruising or bleeding gums  Allergy and Immunologic: No hives or eczema    PAST MEDICAL & SURGICAL HISTORY:  CAD (coronary artery disease)      HTN (hypertension)      High cholesterol      HIV (human immunodeficiency virus infection)      No significant past surgical history          FAMILY HISTORY:      SOCIAL HISTORY:  Smoking Status: [ ] Current, [ ] Former, [ ] Never  Pack Years:    MEDICATIONS:  Pulmonary:  ALBUTerol    90 MICROgram(s) HFA Inhaler 1 Puff(s) Inhalation four times a day PRN  albuterol/ipratropium for Nebulization 3 milliLiter(s) Nebulizer every 4 hours  benzonatate 100 milliGRAM(s) Oral three times a day  tiotropium 18 MICROgram(s) Capsule 1 Capsule(s) Inhalation daily    Antimicrobials:  azithromycin   Tablet 250 milliGRAM(s) Oral daily  emtricitabine 200 mG/tenofovir 300 mG (TRUVADA) 1 Tablet(s) Oral daily  rilpivirine 25 milliGRAM(s) Oral with dinner    Anticoagulants:  aspirin enteric coated 81 milliGRAM(s) Oral daily  enoxaparin Injectable 40 milliGRAM(s) SubCutaneous every 24 hours    Onc:    GI/:    Endocrine:  atorvastatin 20 milliGRAM(s) Oral at bedtime  predniSONE   Tablet 40 milliGRAM(s) Oral daily    Cardiac:  lisinopril 10 milliGRAM(s) Oral daily    Other Medications:  acetaminophen     Tablet .. 650 milliGRAM(s) Oral every 6 hours PRN  chlorhexidine 4% Liquid 1 Application(s) Topical <User Schedule>  nicotine -  14 mG/24Hr(s) Patch 1 Patch Transdermal daily      Allergies    No Known Allergies    Intolerances        Vital Signs Last 24 Hrs  T(C): 36.1 (11 Aug 2022 07:13), Max: 36.4 (10 Aug 2022 16:57)  T(F): 96.9 (11 Aug 2022 07:13), Max: 97.6 (10 Aug 2022 16:57)  HR: 79 (11 Aug 2022 07:13) (70 - 91)  BP: 158/74 (11 Aug 2022 07:13) (114/61 - 158/74)  BP(mean): 114 (11 Aug 2022 05:29) (98 - 114)  RR: 18 (11 Aug 2022 07:13) (18 - 20)  SpO2: 93% (11 Aug 2022 07:13) (93% - 98%)    Parameters below as of 11 Aug 2022 07:13  Patient On (Oxygen Delivery Method): room air        08-11 @ 07:01  -  08-11 @ 12:07  --------------------------------------------------------  IN: 360 mL / OUT: 0 mL / NET: 360 mL          PHYSICAL EXAM:  Constitutional: WDWN  Head: NC/AT  EENT: PERRL, anicteric sclera; oropharynx clear, MMM  Neck: supple, no appreciable JVD  Respiratory: CTA B/L; no W/R/R  Cardiovascular: +S1/S2, RRR  Gastrointestinal: soft, NT/ND; +BSx4  Extremities: WWP; no edema, clubbing or cyanosis  Vascular: 2+ radial, DP/PT pulses B/L  Neurological: AAOx3; no focal deficits    LABS:      CBC Full  -  ( 11 Aug 2022 06:46 )  WBC Count : 6.77 K/uL  RBC Count : 4.11 M/uL  Hemoglobin : 13.5 g/dL  Hematocrit : 39.4 %  Platelet Count - Automated : 148 K/uL  Mean Cell Volume : 95.9 fL  Mean Cell Hemoglobin : 32.8 pg  Mean Cell Hemoglobin Concentration : 34.3 g/dL  Auto Neutrophil # : 4.93 K/uL  Auto Lymphocyte # : 1.21 K/uL  Auto Monocyte # : 0.60 K/uL  Auto Eosinophil # : 0.01 K/uL  Auto Basophil # : 0.00 K/uL  Auto Neutrophil % : 72.8 %  Auto Lymphocyte % : 17.9 %  Auto Monocyte % : 8.9 %  Auto Eosinophil % : 0.1 %  Auto Basophil % : 0.0 %    08-11    138  |  103  |  21<H>  ----------------------------<  121<H>  5.0   |  26  |  0.9    Ca    9.5      11 Aug 2022 06:46  Mg     2.3     08-11    TPro  7.4  /  Alb  4.5  /  TBili  0.5  /  DBili  x   /  AST  21  /  ALT  18  /  AlkPhos  106  08-11                      RADIOLOGY & ADDITIONAL STUDIES:

## 2022-08-11 NOTE — CONSULT NOTE ADULT - ATTENDING COMMENTS
CXR reviewed and appears to be normal.   He is an active heavy smoker and has a history of 20 PY at least.   He could have COPD/emphysema.   He is wheezing on exam as well. Albuterol PRN. Start Spiriva or Anoro. Solumedrol 40mg IV Q8h for now.   Switch to PO prednisone when clinically improved.   Check echocardiogram as part of the workup of dyspnea.   He has a history of HIV. He is also COVID positive now. ID evaluation. Apparently his last viral load was undetectable. This make opportunistic infections highly unlikely. COVID specific therapy per ID's recommendations.   Recommend to check D-dimer and duplex of the lower extremities. If negative VTE is unlikely. If D-dimer is high then recommend CTA chest.   He remains on room air. Ambulate as tolerated.   He will need outpatient evaluation for PFTs as well.   We'll follow.     Above impression is my own.

## 2022-08-11 NOTE — CONSULT NOTE ADULT - CONSULT REQUESTED BY NAME
service
Dr Crane
Patient present to Ed with c/o left sided abdominal pain for past 2 weeks on pain scale 5/10 sharp in sensation, Denies any nausea or vomiting. patient also notes within last few days he has had a cough and sneezing.

## 2022-08-11 NOTE — CONSULT NOTE ADULT - ASSESSMENT
ASSESSMENT  63 yo M PMHx HIV undetectable, CAD s/p PCI x1 2006, HTN, DLD, current smoker presents to ED c/o SOB x 3ms      IMPRESSION  #SOB - Post-viral bronchitis/COPD Exacerbation  - Prednisone 40 mg daily   - azithromycin 250 mg daily for 5 days   - check TTE     #Suspected Severe COPD  - Pulmonary Consyult   - CT Chest   - duonebs for now     #COVID-19 - mild  - can start  mg x 1, followed by 100 mg daily for at least 3 days   - continue Prednisone 40 mg daily     #HIV  - continue Truvada daily  - continue rilpivirin 25 mg daily  - follows with Dr Becerril    #CAD  - continue atorvastatin 20 mg daily  - continue aspirin 81 mg daily    #Hypertension  - continue lisinopril 10 mg daily    #Hyperlipidemia  - continue atorvastatin 20 mg daily    Dispo: Acute

## 2022-08-11 NOTE — CONSULT NOTE ADULT - ASSESSMENT
63 yo male kth HIV, cad sp stents and cabg presented for sob for the past 4 months. He states that for the past 6 weeks he has been worsening. The patient denies any orthopnea, PND, lower ext swelling. However he has a chronic cough, that has become productve for the past few weeks. He tested covid  positive on presentation and he so2 is ranging from 93% to 97% on RA  The patient sis complaint with his medications.   During the encounter, the patient is comfortable and does not feel sob.     #plan and recommendation   the patient is a smoker and he is cutting down.   He has no wheezing on exam. He has a chronic non productive cough, however he has been coughing up some phlegm for the past few weeks.   The patient has cad, however he has no orthopnea and PND.   He has a hx of HIV, in this case the differential should include pcp depending on his cd4 count. He also might have type 1 pulmonary hypertension. So the patient should have a TTE to assess for pHtn and for HF.   The patient needs a TTE, and if unrevealing he should have a pfts as an out patient.Also depending on the cd4 cound and hiv pcr, he might need a ct of the chest and wokup for atypical pulmonary infections on hiv patients.      63 yo male kth HIV, cad sp stents and cabg presented for sob for the past 4 months. He states that for the past 6 weeks he has been worsening. The patient denies any orthopnea, PND, lower ext swelling. However he has a chronic cough, that has become productve for the past few weeks. He tested covid  positive on presentation and he so2 is ranging from 93% to 97% on RA  The patient sis complaint with his medications.   During the encounter, the patient is comfortable and does not feel sob.     #plan and recommendation   the patient is a smoker and he is cutting down.   He has no wheezing on exam. He has a chronic non productive cough, however he has been coughing up some phlegm for the past few weeks.   The patient has cad, however he has no orthopnea and PND.   He has a hx of HIV, in this case the differential should include pcp depending on his cd4 count. He also might have type 1 pulmonary hypertension. So the patient should have a TTE to assess for pHtn and for HF.   The patient needs a TTE, and if unrevealing he should have a pfts as an out patient.Also depending on the cd4 cound and hiv pcr, he might need a ct of the chest and wokup for atypical pulmonary infections on hiv patients.   Check D-dimer and duplex of the lower extremities; Change CT to CTA of the chest.   Will follow up.      61 yo male kth HIV, cad sp stents and cabg presented for sob for the past 4 months. He states that for the past 6 weeks he has been worsening. The patient denies any orthopnea, PND, lower ext swelling. However he has a chronic cough, that has become productve for the past few weeks. He tested covid  positive on presentation and he so2 is ranging from 93% to 97% on RA  The patient sis complaint with his medications.   During the encounter, the patient is comfortable and does not feel sob.     #plan and recommendation   the patient is a smoker and he is cutting down.   He has  wheezing on exam. He has a chronic non productive cough, however he has been coughing up some phlegm for the past few weeks. Continue steroids and start him on Symbicort.    The patient has cad, however he has no orthopnea and PND.   He has a hx of HIV, in this case the differential should include pcp depending on his cd4 count. He also might have type 1 pulmonary hypertension. So the patient should have a TTE to assess for pHtn and for HF.   The patient needs a TTE, and if unrevealing he should have a pfts as an out patient.Also depending on the cd4 cound and hiv pcr, he might need a ct of the chest and wokup for atypical pulmonary infections on hiv patients.   Check D-dimer and duplex of the lower extremities; Change CT to CTA of the chest.   Will follow up.      61 yo male kth HIV, cad sp stents and cabg presented for sob for the past 4 months. He states that for the past 6 weeks he has been worsening. The patient denies any orthopnea, PND, lower ext swelling. However he has a chronic cough, that has become productve for the past few weeks. He tested covid  positive on presentation and he so2 is ranging from 93% to 97% on RA  The patient sis complaint with his medications.   During the encounter, the patient is comfortable and does not feel sob.     #plan and recommendation     CXR reviewed and appears to be normal.   He is an active heavy smoker and has a history of 20 PY at least.   He could have COPD/emphysema.   He is wheezing on exam as well. Albuterol PRN. Start Spiriva or Anoro. Solumedrol 40mg IV Q8h for now.   Switch to PO prednisone when clinically improved.   Check echocardiogram as part of the workup of dyspnea.   He has a history of HIV. He is also COVID positive now. ID evaluation. Apparently his last viral load was undetectable. This make opportunistic infections highly unlikely. COVID specific therapy per ID's recommendations.   Recommend to check D-dimer and duplex of the lower extremities. If negative VTE is unlikely. If D-dimer is high then recommend CTA chest.   He remains on room air. Ambulate as tolerated.   He will need outpatient evaluation for PFTs as well.   We'll follow.      61 yo male kth HIV, cad sp stents and cabg presented for sob for the past 4 months. He states that for the past 6 weeks he has been worsening. The patient denies any orthopnea, PND, lower ext swelling. However he has a chronic cough, that has become productve for the past few weeks. He tested covid  positive on presentation and he so2 is ranging from 93% to 97% on RA  The patient sis complaint with his medications.  During the encounter, the patient is comfortable and does not feel sob.     Impression:     Shortness of breath  COVID positive   Heavy smoker   Likely COPD/emphysema  Wheezing/COPD exacerbation     Plan:    CXR reviewed and appears to be normal.   He is an active heavy smoker and has a history of 20 PY at least.   He could have COPD/emphysema.   He is wheezing on exam as well. Albuterol PRN. Start Spiriva or Anoro. Solumedrol 40mg IV Q8h for now.   Switch to PO prednisone when clinically improved.   Check echocardiogram as part of the workup of dyspnea.   He has a history of HIV. He is also COVID positive now. ID evaluation. Apparently his last viral load was undetectable. This make opportunistic infections highly unlikely. COVID specific therapy per ID's recommendations.   Recommend to check D-dimer and duplex of the lower extremities. If negative VTE is unlikely. If D-dimer is high then recommend CTA chest.   He remains on room air. Ambulate as tolerated.   He will need outpatient evaluation for PFTs as well.   We'll follow.

## 2022-08-12 ENCOUNTER — TRANSCRIPTION ENCOUNTER (OUTPATIENT)
Age: 62
End: 2022-08-12

## 2022-08-12 LAB
ALBUMIN SERPL ELPH-MCNC: 4.1 G/DL — SIGNIFICANT CHANGE UP (ref 3.5–5.2)
ALP SERPL-CCNC: 102 U/L — SIGNIFICANT CHANGE UP (ref 30–115)
ALT FLD-CCNC: 18 U/L — SIGNIFICANT CHANGE UP (ref 0–41)
AST SERPL-CCNC: 22 U/L — SIGNIFICANT CHANGE UP (ref 0–41)
BILIRUB DIRECT SERPL-MCNC: <0.2 MG/DL — SIGNIFICANT CHANGE UP (ref 0–0.3)
BILIRUB INDIRECT FLD-MCNC: >0.1 MG/DL — LOW (ref 0.2–1.2)
BILIRUB SERPL-MCNC: 0.3 MG/DL — SIGNIFICANT CHANGE UP (ref 0.2–1.2)
CREAT SERPL-MCNC: 0.7 MG/DL — SIGNIFICANT CHANGE UP (ref 0.7–1.5)
D DIMER BLD IA.RAPID-MCNC: 221 NG/ML DDU — SIGNIFICANT CHANGE UP (ref 0–230)
EGFR: 103 ML/MIN/1.73M2 — SIGNIFICANT CHANGE UP
FERRITIN SERPL-MCNC: 100 NG/ML — SIGNIFICANT CHANGE UP (ref 30–400)
HCV RNA FLD QL NAA+PROBE: SIGNIFICANT CHANGE UP
HCV RNA SPEC QL PROBE+SIG AMP: SIGNIFICANT CHANGE UP
INR BLD: 1.07 RATIO — SIGNIFICANT CHANGE UP (ref 0.65–1.3)
PROCALCITONIN SERPL-MCNC: <0.02 NG/ML — SIGNIFICANT CHANGE UP (ref 0.02–0.1)
PROT SERPL-MCNC: 6.7 G/DL — SIGNIFICANT CHANGE UP (ref 6–8)
PROTHROM AB SERPL-ACNC: 12.3 SEC — SIGNIFICANT CHANGE UP (ref 9.95–12.87)

## 2022-08-12 PROCEDURE — 93970 EXTREMITY STUDY: CPT | Mod: 26

## 2022-08-12 PROCEDURE — 99233 SBSQ HOSP IP/OBS HIGH 50: CPT

## 2022-08-12 RX ORDER — TIOTROPIUM BROMIDE 18 UG/1
1 CAPSULE ORAL; RESPIRATORY (INHALATION) DAILY
Refills: 0 | Status: DISCONTINUED | OUTPATIENT
Start: 2022-08-12 | End: 2022-08-15

## 2022-08-12 RX ORDER — BUDESONIDE AND FORMOTEROL FUMARATE DIHYDRATE 160; 4.5 UG/1; UG/1
2 AEROSOL RESPIRATORY (INHALATION)
Refills: 0 | Status: DISCONTINUED | OUTPATIENT
Start: 2022-08-12 | End: 2022-08-15

## 2022-08-12 RX ADMIN — Medication 40 MILLIGRAM(S): at 16:15

## 2022-08-12 RX ADMIN — REMDESIVIR 500 MILLIGRAM(S): 5 INJECTION INTRAVENOUS at 18:11

## 2022-08-12 RX ADMIN — Medication 100 MILLIGRAM(S): at 05:20

## 2022-08-12 RX ADMIN — Medication 100 MILLIGRAM(S): at 22:19

## 2022-08-12 RX ADMIN — Medication 100 MILLIGRAM(S): at 16:18

## 2022-08-12 RX ADMIN — AZITHROMYCIN 250 MILLIGRAM(S): 500 TABLET, FILM COATED ORAL at 12:30

## 2022-08-12 RX ADMIN — Medication 40 MILLIGRAM(S): at 22:19

## 2022-08-12 RX ADMIN — Medication 40 MILLIGRAM(S): at 05:20

## 2022-08-12 RX ADMIN — LISINOPRIL 10 MILLIGRAM(S): 2.5 TABLET ORAL at 05:20

## 2022-08-12 RX ADMIN — Medication 1 PATCH: at 12:28

## 2022-08-12 RX ADMIN — ENOXAPARIN SODIUM 40 MILLIGRAM(S): 100 INJECTION SUBCUTANEOUS at 05:20

## 2022-08-12 NOTE — DISCHARGE NOTE PROVIDER - NSDCCPCAREPLAN_GEN_ALL_CORE_FT
PRINCIPAL DISCHARGE DIAGNOSIS  Diagnosis: COPD with emphysema  Assessment and Plan of Treatment: You were found to have COPD. Chronic obstructive pulmonary disease (COPD) is a lung condition in which airflow from the lungs is limited. Causes include smoking, secondhand smoke exposure, genetics, or recurrent infections. Take all medicines (inhaled or pills) as directed by your health care provider. Avoid exposure to irritants such as smoke, chemicals, and fumes that aggravate your breathing.  If you are a smoker, the most important thing that you can do is stop smoking. Continuing to smoke will cause further lung damage and breathing trouble. Ask your health care provider for help with quitting smoking.  SEEK IMMEDIATE MEDICAL CARE IF YOU HAVE ANY OF THE FOLLOWING SYMPTOMS: shortness of breath at rest or when talking, bluish discoloration of lips, skin, fever, worsening cough, unexplained chest pain, or lightheadedness/dizziness.  Please continue to take your medications as prescribed and follow up outpatient in one to two weeks with your PCP and Pulmonologist.          PRINCIPAL DISCHARGE DIAGNOSIS  Diagnosis: COPD with emphysema  Assessment and Plan of Treatment: You were found to have COPD. Pulmnology was consulted and recommended treatment. You were treated with the proper medications. Your symptoms improved. Please continue to take your medications as prescribed and follow up outpatient in one to two weeks with your PCP and Pulmonologist.    Chronic obstructive pulmonary disease (COPD) is a lung condition in which airflow from the lungs is limited. Causes include smoking, secondhand smoke exposure, genetics, or recurrent infections. Take all medicines (inhaled or pills) as directed by your health care provider. Avoid exposure to irritants such as smoke, chemicals, and fumes that aggravate your breathing.  If you are a smoker, the most important thing that you can do is stop smoking. Continuing to smoke will cause further lung damage and breathing trouble. Ask your health care provider for help with quitting smoking.  SEEK IMMEDIATE MEDICAL CARE IF YOU HAVE ANY OF THE FOLLOWING SYMPTOMS: shortness of breath at rest or when talking, bluish discoloration of lips, skin, fever, worsening cough, unexplained chest pain, or lightheadedness/dizziness.      SECONDARY DISCHARGE DIAGNOSES  Diagnosis: 2019 novel coronavirus disease (COVID-19)  Assessment and Plan of Treatment: You were found to be covid 19 positive on admission. You were treated with remdisavir and your symptoms improved. Please continue to take your medications as prescribed and follow up outpatient in one to two weeks with your PCP.  Coronavirus disease 2019 (COVID-19) is a respiratory illness  that can spread from person to person.  The virus is thought to spread mainly between people who  are in close contact with one another (within about 6 feet)  through respiratory droplets produced when an infected  person coughs or sneezes. It also may be possible that a person  can get COVID-19 by touching a surface or object that has  the virus on it and then touching their own mouth, nose, or  possibly their eyes, but this is not thought to be the main  way the virus spreads.  Patients with COVID-19 have had mild to severe respiratory  illness with symptoms of  • fever  • cough  • shortness of breath  People can help protect themselves from respiratory illness with  everyday preventive actions.    • Avoid close contact with people who are sick.  • Avoid touching your eyes, nose, and mouth with  unwashed hands.  • Wash your hands often with soap and water for at least 20   seconds. Use an alcohol-based hand  that contains at  least 60% alcohol if soap and water are not available.   Stay home when you are sick.  • Cover your cough or sneeze with a tissue, then throw the  tissue in the trash.  • Clean and disinfect frequently touched objects  and surfaces.

## 2022-08-12 NOTE — PROGRESS NOTE ADULT - ASSESSMENT
ASSESSMENT  63 yo M PMHx HIV undetectable, CAD s/p PCI x1 2006, HTN, DLD, current smoker presents to ED c/o SOB x 3ms      IMPRESSION  #SOB - Post-viral bronchitis/COPD Exacerbation  - Prednisone 40 mg daily followed by taper   - azithromycin 250 mg daily for 5 days   - follow-up TTE     #Severe COPD/Emphysema   - CT Chest No Cont (08.11.22 @ 18:44): Moderate to severe bilateral emphysematous changes. Right upper lobe 0.6 cm solid nodule. Consider follow-up in 6-12 months  to confirm stability/resolution.  - appreciate pulm consult  - symbicort  - spiriva daily   - duonebs PRN    #COVID-19 - mild  - can start  mg x 1, followed by 100 mg daily for at least 3 days   - continue Prednisone 40 mg daily     #HIV  - continue Truvada daily  - continue rilpivirin 25 mg daily  - follows with Dr Becerril    #CAD  - continue atorvastatin 20 mg daily  - continue aspirin 81 mg daily    #Hypertension  - continue lisinopril 10 mg daily    #Hyperlipidemia  - continue atorvastatin 20 mg daily    Dispo: Acute

## 2022-08-12 NOTE — DISCHARGE NOTE PROVIDER - PROVIDER TOKENS
PROVIDER:[TOKEN:[[id number]]],PROVIDER:[TOKEN:[[id number]]],PROVIDER:[TOKEN:[[id number]]]

## 2022-08-12 NOTE — DISCHARGE NOTE PROVIDER - HOSPITAL COURSE
HPI:  63 yo M PMHx HIV undetectable, CAD s/p PCI x1 2006, HTN, DLD, current smoker presents to ED c/o SOB x 3ms.      Patient states that he began to feel mild sob, mainly with exertion, decided to cut down on cigarets to ~ 6 per day, but sob c/w to worsen. One month ago sob was present at rest and exertion, and was a/w product cough with greenish sputum and subjective f/c. Patient states he is vaccinated for covid x2 and booted x1 (2/22). Denies, fatigue, dizziness, H/A, chest pain, palpitations, n/v/d/c or urinary symptoms. Says he is compliant with HIV meds and has good OP f/u with ID.     In the ED, VS wnl, T 98.9F HR: 95 BP: 130/62 RR: 20 SpO2: 97% on RA. Labs notable for COVID- 19 (+). CXR w/o acute cardiopulm disease. Pt received solumedrol 125mg x1 in the ED. Being admitted for SOB.     Med list obtained from pt.      (10 Aug 2022 15:44)    For covid, pt is on RA, afebrile, satting wnl. seen by ID who recommended remdesivir x3days.    For dyspnea/sob, pulm was c/s; cat scan ordered which revealed emphysema. Pulm recommend solumedrol 40mg q8, spiriva. O/p f/u and PFTs. yearly lowdose catscan.     HPI:  61 yo M PMHx HIV undetectable, CAD s/p PCI x1 2006, HTN, DLD, current smoker presents to ED c/o SOB x 3ms.      Patient states that he began to feel mild sob, mainly with exertion, decided to cut down on cigarets to ~ 6 per day, but sob c/w to worsen. One month ago sob was present at rest and exertion, and was a/w product cough with greenish sputum and subjective f/c. Patient states he is vaccinated for covid x2 and booted x1 (2/22). Denies, fatigue, dizziness, H/A, chest pain, palpitations, n/v/d/c or urinary symptoms. Says he is compliant with HIV meds and has good OP f/u with ID.     In the ED, VS wnl, T 98.9F HR: 95 BP: 130/62 RR: 20 SpO2: 97% on RA. Labs notable for COVID- 19 (+). CXR w/o acute cardiopulm disease. Pt received solumedrol 125mg x1 in the ED. Being admitted for SOB.     Med list obtained from pt.     Patient admitted for his SOB.   For covid, pt is on RA, afebrile, satting wnl. seen by ID who recommended remdesivir x3days. For dyspnea/sob, pulm was c/s; cat scan ordered which revealed emphysema. Pulm recommend solumedrol 40mg q8, spiriva, and duonebs PRN. O/p f/u and PFTs. yearly lowdose catscan. for his HIV, pt continued w Truvada, rilpivirine. for his CAD, pt c/w atorvastatin and aspirin.     Patient seen and examined, stable for discharge .   ASSESSMENT  61 yo M PMHx HIV undetectable, CAD s/p PCI x1 2006, HTN, DLD, current smoker presents to ED c/o SOB x 3ms      IMPRESSION  #SOB - Post-viral bronchitis/COPD Exacerbation  - Prednisone 40 mg daily followed by taper   - azithromycin 250 mg daily for 5 days (day 3/5)  - follow-up TTE - EF 58%, no evidence of mitral regurgitation     #Severe COPD/Emphysema   - CT Chest No Cont (08.11.22 @ 18:44): Moderate to severe bilateral emphysematous changes. Right upper lobe 0.6 cm solid nodule. Consider follow-up in 6-12 months  to confirm stability/resolution.  - appreciate pulm consult  - symbicort  - spiriva daily   - елена PRN    #COVID-19 - mild  - can start  mg x 1, followed by 100 mg daily for at least 3 days     #HIV  - continue Truvada daily  - continue rilpivirin 25 mg daily  - follows with Dr Becerril    #CAD  - continue atorvastatin 20 mg daily  - continue aspirin 81 mg daily    #Hypertension  - continue lisinopril 10 mg daily    #Hyperlipidemia  - continue atorvastatin 20 mg daily   HPI:  61 yo M PMHx HIV undetectable, CAD s/p PCI x1 2006, HTN, DLD, current smoker presents to ED c/o SOB x 3ms.      Patient states that he began to feel mild sob, mainly with exertion, decided to cut down on cigarets to ~ 6 per day, but sob c/w to worsen. One month ago sob was present at rest and exertion, and was a/w product cough with greenish sputum and subjective f/c. Patient states he is vaccinated for covid x2 and booted x1 (2/22). Denies, fatigue, dizziness, H/A, chest pain, palpitations, n/v/d/c or urinary symptoms. Says he is compliant with HIV meds and has good OP f/u with ID.     In the ED, VS wnl, T 98.9F HR: 95 BP: 130/62 RR: 20 SpO2: 97% on RA. Labs notable for COVID- 19 (+). CXR w/o acute cardiopulm disease. Pt received solumedrol 125mg x1 in the ED. Being admitted for SOB.     Med list obtained from pt.     Patient admitted for his SOB.   For covid, pt is on RA, afebrile, satting wnl. seen by ID who recommended remdesivir x3days. For dyspnea/sob, pulm was c/s; cat scan ordered which revealed emphysema. Pulm recommend solumedrol 40mg q8, spiriva, and duonebs PRN. O/p f/u and PFTs. yearly lowdose catscan. for his HIV, pt continued w Truvada, rilpivirine. for his CAD, pt c/w atorvastatin and aspirin.     Patient seen and examined, stable for discharge .   ASSESSMENT  61 yo M PMHx HIV undetectable, CAD s/p PCI x1 2006, HTN, DLD, current smoker presents to ED c/o SOB x 3ms      IMPRESSION  #SOB - Post-viral bronchitis/COPD Exacerbation  - Prednisone 40 mg daily followed by taper   - azithromycin 250 mg daily for 5 days (day 3/5)  - follow-up TTE - EF 58%, no evidence of mitral regurgitation     #Severe COPD/Emphysema   - CT Chest No Cont (08.11.22 @ 18:44): Moderate to severe bilateral emphysematous changes. Right upper lobe 0.6 cm solid nodule. Consider follow-up in 6-12 months  to confirm stability/resolution.  - Pulm consult appreciated   - symbicort  - spiriva daily   - елена PRN    #COVID-19 - mild  - can start  mg x 1, followed by 100 mg daily for at least 3 days     #HIV  - continue Truvada daily on d/c   - continue rilpivirin 25 mg daily on d/c   - follows with Dr Becerril    #CAD  - continue atorvastatin 20 mg daily on d/c   - continue aspirin 81 mg daily on d/c     #Hypertension  - continue lisinopril 10 mg daily on d/c     #Hyperlipidemia  - continue atorvastatin 20 mg daily on d/c

## 2022-08-12 NOTE — DISCHARGE NOTE PROVIDER - NSDCMRMEDTOKEN_GEN_ALL_CORE_FT
Aspir 81 oral delayed release tablet: 1 tab(s) orally once a day  atorvastatin 20 mg oral tablet: 1 tab(s) orally once a day  Complera oral tablet: 1 tab(s) orally once a day  lisinopril 10 mg oral tablet: 1 tab(s) orally once a day   albuterol 90 mcg/inh inhalation aerosol: 1 puff(s) inhaled every 6 hours, As Needed -Bronchospasm   Aspir 81 oral delayed release tablet: 1 tab(s) orally once a day  atorvastatin 20 mg oral tablet: 1 tab(s) orally once a day  budesonide-formoterol 160 mcg-4.5 mcg/inh inhalation aerosol: 2 puff(s) inhaled 2 times a day   Complera oral tablet: 1 tab(s) orally once a day  lisinopril 10 mg oral tablet: 1 tab(s) orally once a day  predniSONE 10 mg oral tablet: 3 tab(s) orally once a day for 2 days until August 19   predniSONE 10 mg oral tablet: 1 tab(s) orally once a day for 2 days until August 23rd   predniSONE 20 mg oral tablet: 1 tab(s) orally once a day for 2 days until August 21st  predniSONE 20 mg oral tablet: 2 tab(s) orally once a day for 2 days until August 17th.   predniSONE 5 mg oral tablet: 1 tab(s) orally once a day for 2 days until August 25th   tiotropium 18 mcg inhalation capsule: 1 cap(s) inhaled once a day

## 2022-08-12 NOTE — DISCHARGE NOTE PROVIDER - CARE PROVIDER_API CALL
MAICOL VERNON  Internal Medicine  [address] [address]  Phone: [phone]  Fax: [phone])-  Follow Up Time:     Manuel Glynn)  Internal Medicine  [address] [place] [address]  Phone: [phone]  Fax: [phone]  Follow Up Time:     Donny Smith)  Critical Care Medicine; Internal Medicine; Pulmonary Disease  [address] [place] [address]  Phone: [phone]  Fax: [phone]  Follow Up Time:

## 2022-08-12 NOTE — PROGRESS NOTE ADULT - ASSESSMENT
Impression:     Shortness of breath  COVID positive   Heavy smoker   Likely COPD/emphysema  Wheezing/COPD exacerbation     Plan:    CT chest done shows moderate to severe upper lobe predominant emphysema as well as a 6mm nodule in the RUL.   He is an active heavy smoker and has a history of 20 PY at least.   Continue albuterol as needed. Switch to PO prednisone and taper over 10 days.   He needs to be maintenance inhalers. Start with triple therapy for now: Symbicort and Spiriva.   Echocardiogram ordered and is pending.  Continue COVID specific therapy per ID's recommendations.  Duplex of the lower extremities negative for VTE.   He will need outpatient pulmonary follow up for PFTs, possible pulmonary rehab.   Smoking cessation strongly emphasized and discussed with the patient.  Will follow as needed.

## 2022-08-13 LAB
ALBUMIN SERPL ELPH-MCNC: 4.2 G/DL — SIGNIFICANT CHANGE UP (ref 3.5–5.2)
ALP SERPL-CCNC: 98 U/L — SIGNIFICANT CHANGE UP (ref 30–115)
ALT FLD-CCNC: 24 U/L — SIGNIFICANT CHANGE UP (ref 0–41)
AST SERPL-CCNC: 21 U/L — SIGNIFICANT CHANGE UP (ref 0–41)
BILIRUB DIRECT SERPL-MCNC: <0.2 MG/DL — SIGNIFICANT CHANGE UP (ref 0–0.3)
BILIRUB INDIRECT FLD-MCNC: >0.1 MG/DL — LOW (ref 0.2–1.2)
BILIRUB SERPL-MCNC: 0.3 MG/DL — SIGNIFICANT CHANGE UP (ref 0.2–1.2)
CREAT SERPL-MCNC: 0.7 MG/DL — SIGNIFICANT CHANGE UP (ref 0.7–1.5)
EGFR: 103 ML/MIN/1.73M2 — SIGNIFICANT CHANGE UP
INR BLD: 1.13 RATIO — SIGNIFICANT CHANGE UP (ref 0.65–1.3)
PROT SERPL-MCNC: 7.1 G/DL — SIGNIFICANT CHANGE UP (ref 6–8)
PROTHROM AB SERPL-ACNC: 13 SEC — HIGH (ref 9.95–12.87)

## 2022-08-13 RX ADMIN — Medication 1 PATCH: at 11:59

## 2022-08-13 RX ADMIN — Medication 100 MILLIGRAM(S): at 13:29

## 2022-08-13 RX ADMIN — ATORVASTATIN CALCIUM 20 MILLIGRAM(S): 80 TABLET, FILM COATED ORAL at 22:11

## 2022-08-13 RX ADMIN — AZITHROMYCIN 250 MILLIGRAM(S): 500 TABLET, FILM COATED ORAL at 11:58

## 2022-08-13 RX ADMIN — Medication 40 MILLIGRAM(S): at 13:29

## 2022-08-13 RX ADMIN — Medication 40 MILLIGRAM(S): at 22:11

## 2022-08-13 RX ADMIN — Medication 100 MILLIGRAM(S): at 22:10

## 2022-08-13 RX ADMIN — BUDESONIDE AND FORMOTEROL FUMARATE DIHYDRATE 2 PUFF(S): 160; 4.5 AEROSOL RESPIRATORY (INHALATION) at 18:00

## 2022-08-13 RX ADMIN — ENOXAPARIN SODIUM 40 MILLIGRAM(S): 100 INJECTION SUBCUTANEOUS at 05:50

## 2022-08-13 RX ADMIN — Medication 81 MILLIGRAM(S): at 11:58

## 2022-08-13 RX ADMIN — REMDESIVIR 500 MILLIGRAM(S): 5 INJECTION INTRAVENOUS at 22:10

## 2022-08-13 RX ADMIN — ALBUTEROL 1 PUFF(S): 90 AEROSOL, METERED ORAL at 05:51

## 2022-08-13 RX ADMIN — Medication 100 MILLIGRAM(S): at 05:50

## 2022-08-13 RX ADMIN — Medication 1 PATCH: at 19:31

## 2022-08-13 RX ADMIN — Medication 40 MILLIGRAM(S): at 05:50

## 2022-08-13 NOTE — PROGRESS NOTE ADULT - ASSESSMENT
ASSESSMENT  61 yo M PMHx HIV undetectable, CAD s/p PCI x1 2006, HTN, DLD, current smoker presents to ED c/o SOB x 3ms      IMPRESSION  #SOB - Post-viral bronchitis/COPD Exacerbation  - Prednisone 40 mg daily followed by taper   - azithromycin 250 mg daily for 5 days (day 3/5)  - follow-up TTE     #Severe COPD/Emphysema   - CT Chest No Cont (08.11.22 @ 18:44): Moderate to severe bilateral emphysematous changes. Right upper lobe 0.6 cm solid nodule. Consider follow-up in 6-12 months  to confirm stability/resolution.  - appreciate pulm consult  - symbicort  - spiriva daily   - duonebs PRN    #COVID-19 - mild  - can start  mg x 1, followed by 100 mg daily for at least 3 days     #HIV  - continue Truvada daily  - continue rilpivirin 25 mg daily  - follows with Dr Becerril    #CAD  - continue atorvastatin 20 mg daily  - continue aspirin 81 mg daily    #Hypertension  - continue lisinopril 10 mg daily    #Hyperlipidemia  - continue atorvastatin 20 mg daily    Dispo: Acute

## 2022-08-14 LAB
ALBUMIN SERPL ELPH-MCNC: 4.2 G/DL — SIGNIFICANT CHANGE UP (ref 3.5–5.2)
ALP SERPL-CCNC: 100 U/L — SIGNIFICANT CHANGE UP (ref 30–115)
ALT FLD-CCNC: 57 U/L — HIGH (ref 0–41)
AST SERPL-CCNC: 50 U/L — HIGH (ref 0–41)
BILIRUB DIRECT SERPL-MCNC: 0.2 MG/DL — SIGNIFICANT CHANGE UP (ref 0–0.3)
BILIRUB INDIRECT FLD-MCNC: 0.3 MG/DL — SIGNIFICANT CHANGE UP (ref 0.2–1.2)
BILIRUB SERPL-MCNC: 0.5 MG/DL — SIGNIFICANT CHANGE UP (ref 0.2–1.2)
CREAT SERPL-MCNC: 0.8 MG/DL — SIGNIFICANT CHANGE UP (ref 0.7–1.5)
EGFR: 99 ML/MIN/1.73M2 — SIGNIFICANT CHANGE UP
GLUCOSE BLDC GLUCOMTR-MCNC: 153 MG/DL — HIGH (ref 70–99)
INR BLD: 1.19 RATIO — SIGNIFICANT CHANGE UP (ref 0.65–1.3)
PROT SERPL-MCNC: 6.8 G/DL — SIGNIFICANT CHANGE UP (ref 6–8)
PROTHROM AB SERPL-ACNC: 13.7 SEC — HIGH (ref 9.95–12.87)

## 2022-08-14 PROCEDURE — 93306 TTE W/DOPPLER COMPLETE: CPT | Mod: 26

## 2022-08-14 RX ADMIN — AZITHROMYCIN 250 MILLIGRAM(S): 500 TABLET, FILM COATED ORAL at 13:27

## 2022-08-14 RX ADMIN — Medication 40 MILLIGRAM(S): at 21:39

## 2022-08-14 RX ADMIN — Medication 40 MILLIGRAM(S): at 13:28

## 2022-08-14 RX ADMIN — BUDESONIDE AND FORMOTEROL FUMARATE DIHYDRATE 2 PUFF(S): 160; 4.5 AEROSOL RESPIRATORY (INHALATION) at 08:30

## 2022-08-14 RX ADMIN — Medication 100 MILLIGRAM(S): at 21:39

## 2022-08-14 RX ADMIN — Medication 100 MILLIGRAM(S): at 13:27

## 2022-08-14 RX ADMIN — ENOXAPARIN SODIUM 40 MILLIGRAM(S): 100 INJECTION SUBCUTANEOUS at 06:17

## 2022-08-14 RX ADMIN — BUDESONIDE AND FORMOTEROL FUMARATE DIHYDRATE 2 PUFF(S): 160; 4.5 AEROSOL RESPIRATORY (INHALATION) at 21:47

## 2022-08-14 RX ADMIN — Medication 40 MILLIGRAM(S): at 06:17

## 2022-08-14 RX ADMIN — LISINOPRIL 10 MILLIGRAM(S): 2.5 TABLET ORAL at 06:18

## 2022-08-14 RX ADMIN — Medication 100 MILLIGRAM(S): at 06:18

## 2022-08-14 NOTE — PROGRESS NOTE ADULT - ASSESSMENT
ASSESSMENT  61 yo M PMHx HIV undetectable, CAD s/p PCI x1 2006, HTN, DLD, current smoker presents to ED c/o SOB x 3ms      IMPRESSION  #SOB - Post-viral bronchitis/COPD Exacerbation  - Prednisone 40 mg daily followed by taper   - azithromycin 250 mg daily for 5 days (day 3/5)  - follow-up TTE     #Severe COPD/Emphysema   - CT Chest No Cont (08.11.22 @ 18:44): Moderate to severe bilateral emphysematous changes. Right upper lobe 0.6 cm solid nodule. Consider follow-up in 6-12 months  to confirm stability/resolution.  - appreciate pulm consult  - symbicort  - spiriva daily   - duonebs PRN    #COVID-19 - mild  - can start  mg x 1, followed by 100 mg daily for at least 3 days     #HIV  - continue Truvada daily  - continue rilpivirin 25 mg daily  - follows with Dr Becerril    #CAD  - continue atorvastatin 20 mg daily  - continue aspirin 81 mg daily    #Hypertension  - continue lisinopril 10 mg daily    #Hyperlipidemia  - continue atorvastatin 20 mg daily    Dispo: Acute       ASSESSMENT  61 yo M PMHx HIV undetectable, CAD s/p PCI x1 2006, HTN, DLD, current smoker presents to ED c/o SOB x 3ms      IMPRESSION  #SOB - Post-viral bronchitis/COPD Exacerbation  - Prednisone 40 mg daily followed by taper   - azithromycin 250 mg daily for 5 days (day 3/5)  - follow-up TTE     #Severe COPD/Emphysema   - CT Chest No Cont (08.11.22 @ 18:44): Moderate to severe bilateral emphysematous changes. Right upper lobe 0.6 cm solid nodule. Consider follow-up in 6-12 months  to confirm stability/resolution.  - appreciate pulm consult  - symbicort  - spiriva daily   - duonebs PRN    #COVID-19 - mild  - can start  mg x 1, followed by 100 mg daily for at least 3 days     #HIV  - continue Truvada daily  - continue rilpivirin 25 mg daily  - follows with Dr Becerril    #CAD  - continue atorvastatin 20 mg daily  - continue aspirin 81 mg daily    #Hypertension  - continue lisinopril 10 mg daily    #Hyperlipidemia  - continue atorvastatin 20 mg daily    Dispo: Acute, pending TTE -

## 2022-08-15 ENCOUNTER — TRANSCRIPTION ENCOUNTER (OUTPATIENT)
Age: 62
End: 2022-08-15

## 2022-08-15 VITALS
TEMPERATURE: 97 F | RESPIRATION RATE: 18 BRPM | DIASTOLIC BLOOD PRESSURE: 65 MMHG | SYSTOLIC BLOOD PRESSURE: 130 MMHG | HEART RATE: 70 BPM | OXYGEN SATURATION: 92 %

## 2022-08-15 LAB
ALBUMIN SERPL ELPH-MCNC: 4.2 G/DL — SIGNIFICANT CHANGE UP (ref 3.5–5.2)
ALP SERPL-CCNC: 93 U/L — SIGNIFICANT CHANGE UP (ref 30–115)
ALT FLD-CCNC: 72 U/L — HIGH (ref 0–41)
AST SERPL-CCNC: 33 U/L — SIGNIFICANT CHANGE UP (ref 0–41)
BILIRUB DIRECT SERPL-MCNC: <0.2 MG/DL — SIGNIFICANT CHANGE UP (ref 0–0.3)
BILIRUB INDIRECT FLD-MCNC: >0.1 MG/DL — LOW (ref 0.2–1.2)
BILIRUB SERPL-MCNC: 0.3 MG/DL — SIGNIFICANT CHANGE UP (ref 0.2–1.2)
CREAT SERPL-MCNC: 0.8 MG/DL — SIGNIFICANT CHANGE UP (ref 0.7–1.5)
CULTURE RESULTS: SIGNIFICANT CHANGE UP
CULTURE RESULTS: SIGNIFICANT CHANGE UP
EGFR: 99 ML/MIN/1.73M2 — SIGNIFICANT CHANGE UP
INR BLD: 1.14 RATIO — SIGNIFICANT CHANGE UP (ref 0.65–1.3)
PROT SERPL-MCNC: 6.7 G/DL — SIGNIFICANT CHANGE UP (ref 6–8)
PROTHROM AB SERPL-ACNC: 13.1 SEC — HIGH (ref 9.95–12.87)
SPECIMEN SOURCE: SIGNIFICANT CHANGE UP
SPECIMEN SOURCE: SIGNIFICANT CHANGE UP

## 2022-08-15 RX ORDER — BUDESONIDE AND FORMOTEROL FUMARATE DIHYDRATE 160; 4.5 UG/1; UG/1
2 AEROSOL RESPIRATORY (INHALATION)
Qty: 120 | Refills: 2
Start: 2022-08-15 | End: 2022-11-12

## 2022-08-15 RX ORDER — TIOTROPIUM BROMIDE 18 UG/1
2 CAPSULE ORAL; RESPIRATORY (INHALATION)
Qty: 60 | Refills: 2
Start: 2022-08-15 | End: 2022-11-12

## 2022-08-15 RX ORDER — TIOTROPIUM BROMIDE 18 UG/1
1 CAPSULE ORAL; RESPIRATORY (INHALATION)
Qty: 90 | Refills: 2
Start: 2022-08-15 | End: 2022-11-12

## 2022-08-15 RX ORDER — PANTOPRAZOLE SODIUM 20 MG/1
1 TABLET, DELAYED RELEASE ORAL
Qty: 10 | Refills: 0
Start: 2022-08-15 | End: 2022-08-24

## 2022-08-15 RX ORDER — ALBUTEROL 90 UG/1
1 AEROSOL, METERED ORAL
Qty: 120 | Refills: 0
Start: 2022-08-15 | End: 2022-09-13

## 2022-08-15 RX ADMIN — ENOXAPARIN SODIUM 40 MILLIGRAM(S): 100 INJECTION SUBCUTANEOUS at 06:13

## 2022-08-15 RX ADMIN — TIOTROPIUM BROMIDE 1 CAPSULE(S): 18 CAPSULE ORAL; RESPIRATORY (INHALATION) at 08:27

## 2022-08-15 RX ADMIN — ALBUTEROL 2.5 MILLIGRAM(S): 90 AEROSOL, METERED ORAL at 08:50

## 2022-08-15 RX ADMIN — Medication 100 MILLIGRAM(S): at 06:13

## 2022-08-15 RX ADMIN — ALBUTEROL 1 PUFF(S): 90 AEROSOL, METERED ORAL at 08:48

## 2022-08-15 RX ADMIN — BUDESONIDE AND FORMOTEROL FUMARATE DIHYDRATE 2 PUFF(S): 160; 4.5 AEROSOL RESPIRATORY (INHALATION) at 08:27

## 2022-08-15 RX ADMIN — AZITHROMYCIN 250 MILLIGRAM(S): 500 TABLET, FILM COATED ORAL at 11:35

## 2022-08-15 RX ADMIN — Medication 40 MILLIGRAM(S): at 06:13

## 2022-08-15 NOTE — DISCHARGE NOTE NURSING/CASE MANAGEMENT/SOCIAL WORK - NSPROMEDSRETURNEDYESNO_GEN_A_NUR
Patient and Pharmacy state the patient did not submit any medication to hospital pharmacy during admission

## 2022-08-15 NOTE — DISCHARGE NOTE NURSING/CASE MANAGEMENT/SOCIAL WORK - PATIENT PORTAL LINK FT
You can access the FollowMyHealth Patient Portal offered by [facility] by registering at the following website: [email or web address]. By joining [employer]’s FollowMyHealth portal, you will also be able to view your health information using other applications (apps) compatible with our system.

## 2022-08-15 NOTE — DISCHARGE NOTE NURSING/CASE MANAGEMENT/SOCIAL WORK - NSDCPEFALRISK_GEN_ALL_CORE
For information on Fall & Injury Prevention, visit: [email or web address] OR  [email or web address] OR  https://www.cdc.gov/steadi/patient.html

## 2022-08-15 NOTE — PROGRESS NOTE ADULT - PROVIDER SPECIALTY LIST ADULT
Infectious Disease
Internal Medicine
Internal Medicine
Infectious Disease
Pulmonology
Infectious Disease
Infectious Disease

## 2022-08-15 NOTE — PROGRESS NOTE ADULT - SUBJECTIVE AND OBJECTIVE BOX
MARY CLANCY 62y Male  MRN#: [id number]   CODE STATUS: Full      SUBJECTIVE  Patient is a 62y old Male who presents with a chief complaint of SOB (12 Aug 2022 18:29)  Currently admitted to medicine with the primary diagnosis of SOB (shortness of breath)    Today is hospital day 3d, and this morning patient was in no acute distress but reported being concerned about going home and needing to gasp for air which he reported still happens on occasion in the hospital. Otherwise, no medical complaints. No acute events overnight.      OBJECTIVE  PAST MEDICAL & SURGICAL HISTORY  CAD (coronary artery disease)    HTN (hypertension)    High cholesterol    HIV (human immunodeficiency virus infection)    No significant past surgical history      ALLERGIES:  No Known Allergies    MEDICATIONS:  STANDING MEDICATIONS  ALBUTerol    0.083%. 2.5 milliGRAM(s) Nebulizer once  aspirin enteric coated 81 milliGRAM(s) Oral daily  atorvastatin 20 milliGRAM(s) Oral at bedtime  azithromycin   Tablet 250 milliGRAM(s) Oral daily  benzonatate 100 milliGRAM(s) Oral three times a day  budesonide 160 MICROgram(s)/formoterol 4.5 MICROgram(s) Inhaler 2 Puff(s) Inhalation two times a day  chlorhexidine 4% Liquid 1 Application(s) Topical <User Schedule>  emtricitabine 200 mG/tenofovir 300 mG (TRUVADA) 1 Tablet(s) Oral daily  enoxaparin Injectable 40 milliGRAM(s) SubCutaneous every 24 hours  lisinopril 10 milliGRAM(s) Oral daily  methylPREDNISolone sodium succinate Injectable 40 milliGRAM(s) IV Push every 8 hours  nicotine -  14 mG/24Hr(s) Patch 1 Patch Transdermal daily  remdesivir  IVPB   IV Intermittent   remdesivir  IVPB 100 milliGRAM(s) IV Intermittent every 24 hours  rilpivirine 25 milliGRAM(s) Oral with dinner  tiotropium 18 MICROgram(s) Capsule 1 Capsule(s) Inhalation daily    PRN MEDICATIONS  acetaminophen     Tablet .. 650 milliGRAM(s) Oral every 6 hours PRN  ALBUTerol    90 MICROgram(s) HFA Inhaler 1 Puff(s) Inhalation four times a day PRN      VITAL SIGNS: Last 24 Hours  T(C): 35.8 (13 Aug 2022 08:00), Max: 36.1 (12 Aug 2022 15:17)  T(F): 96.5 (13 Aug 2022 08:00), Max: 97 (12 Aug 2022 15:17)  HR: 62 (13 Aug 2022 08:00) (62 - 76)  BP: 135/73 (13 Aug 2022 08:00) (113/68 - 146/72)  RR: 18 (13 Aug 2022 08:00) (18 - 18)      LABS:    TPro  7.1  /  Alb  4.2  /  TBili  0.3  /  DBili  <0.2  /  AST  21  /  ALT  24  /  AlkPhos  98  08-13    PT/INR - ( 13 Aug 2022 07:42 )   PT: 13.00 sec;   INR: 1.13 ratio         Culture - Blood (collected 10 Aug 2022 12:30)  Source: .Blood Blood  Preliminary Report (11 Aug 2022 23:01):    No growth to date.    Culture - Blood (collected 10 Aug 2022 12:30)  Source: .Blood Blood  Preliminary Report (11 Aug 2022 23:01):    No growth to date.        RADIOLOGY:  VA Duplex Lower Ext Vein Scan, Bilat (08.12.22 @ 08:47) >No evidence of deep venous thrombosis in either lower extremity.    CT Chest No Cont (08.11.22 @ 18:44) >  Moderate to severe bilateral emphysematous changes.    Right upper lobe 0.6 cm solid nodule. Consider follow-up in 6-12 months to confirm stability/resolution.    Xray Chest 1 View- PORTABLE-Urgent (08.10.22 @ 11:00) >No radiographic evidence of acute cardiopulmonary disease    Xray Chest 2 Views PA/Lat (07.11.18 @ 20:55) >No radiographic evidence of acute cardiopulmonary disease.        PHYSICAL EXAM:  GENERAL: NAD, well-developed  HEENT:  Atraumatic, Normocephalic. EOMI  PULMONARY: Breath sounds decreased throughout  CARDIOVASCULAR: Regular rate and rhythm  GASTROINTESTINAL: Soft, Nontender, Nondistended  MUSCULOSKELETAL:  No clubbing, cyanosis, or edema  NEUROLOGY: non-focal, normal gait and movement of extremities  SKIN: No rashes or lesions      ADMISSION SUMMARY  Patient is a 62y old Male who presents with a chief complaint of SOB (11 Aug 2022 15:40)  Currently admitted to medicine with the primary diagnosis of SOB (shortness of breath)      ASSESSMENT & PLAN  63 yo M PMHx HIV undetectable, CAD s/p PCI x1 2006, HTN, DLD, current smoker presents to ED c/o SOB for 3 months, also found to be COVID positive.    #SOB - Post-viral bronchitis/COPD Exacerbation  - Prednisone 40 mg daily   - azithromycin 250 mg daily for 5 days   - TTE ordered    #Suspected COPD  - Pulmonary Consulted, following up with patient  - CT Chest - found Moderate to severe bilateral emphysematous changes  - F/u OP Pulmonology    #COVID-19 - mild  - can start  mg x 1, followed by 100 mg daily for at least 3 days   - continue Prednisone 40 mg daily     #HIV  - continue Truvada daily  - continue rilpivirin 25 mg daily    #CAD  - continue atorvastatin 20 mg daily  - continue aspirin 81 mg daily    #HTN  - continue lisinopril 10 mg daily    #HLD  - continue atorvastatin 20 mg daily  
SUSANNAMARY EVANS  62y, Male  Allergy: No Known Allergies      LOS  4d    CHIEF COMPLAINT: SOB (13 Aug 2022 18:59)      INTERVAL EVENTS/HPI  - No acute events overnight  - T(F): , Max: 96.9 (08-14-22 @ 15:34)  - Denies any worsening symptoms  - Tolerating medication    - Creatinine, Serum: 0.8 (08-14-22 @ 09:14)  Creatinine, Serum: 0.7 (08-13-22 @ 07:42)       ROS  General: Denies rigors, nightsweats  HEENT: Denies headache, rhinorrhea, sore throat, eye pain  CV: Denies CP, palpitations  PULM: Denies wheezing, hemoptysis  GI: Denies hematemesis, hematochezia, melena  : Denies discharge, hematuria  MSK: Denies arthralgias, myalgias  SKIN: Denies rash, lesions  NEURO: Denies paresthesias, weakness  PSYCH: Denies depression, anxiety    VITALS:  T(F): 96.9, Max: 96.9 (08-14-22 @ 15:34)  HR: 78  BP: 133/59  RR: 18Vital Signs Last 24 Hrs  T(C): 36.1 (14 Aug 2022 15:34), Max: 36.1 (14 Aug 2022 15:34)  T(F): 96.9 (14 Aug 2022 15:34), Max: 96.9 (14 Aug 2022 15:34)  HR: 78 (14 Aug 2022 15:34) (70 - 78)  BP: 133/59 (14 Aug 2022 15:34) (130/66 - 133/59)  BP(mean): --  RR: 18 (14 Aug 2022 15:34) (18 - 18)  SpO2: 96% (14 Aug 2022 15:34) (90% - 96%)    Parameters below as of 14 Aug 2022 15:34  Patient On (Oxygen Delivery Method): room air        PHYSICAL EXAM:  Gen: NAD, resting in bed  HEENT: Normocephalic, atraumatic  Neck: supple, no lymphadenopathy  CV: Regular rate & regular rhythm  Lungs: decreased BS at bases, no fremitus  Abdomen: Soft, BS present  Ext: Warm, well perfused  Neuro: non focal, awake  Skin: no rash, no erythema  Lines: no phlebitis    FH: Non-contributory  Social Hx: Non-contributory    TESTS & MEASUREMENTS:    08-14    x   |  x   |  x   ----------------------------<  x   x    |  x   |  0.8      TPro  6.8  /  Alb  4.2  /  TBili  0.5  /  DBili  0.2  /  AST  50<H>  /  ALT  57<H>  /  AlkPhos  100  08-14      LIVER FUNCTIONS - ( 14 Aug 2022 09:14 )  Alb: 4.2 g/dL / Pro: 6.8 g/dL / ALK PHOS: 100 U/L / ALT: 57 U/L / AST: 50 U/L / GGT: x               Culture - Blood (collected 08-10-22 @ 12:30)  Source: .Blood Blood  Preliminary Report (08-11-22 @ 23:01):    No growth to date.    Culture - Blood (collected 08-10-22 @ 12:30)  Source: .Blood Blood  Preliminary Report (08-11-22 @ 23:01):    No growth to date.        Lactate, Blood: 1.2 mmol/L (08-10-22 @ 09:15)      INFECTIOUS DISEASES TESTING  Procalcitonin, Serum: <0.02 (08-11-22 @ 12:17)      INFLAMMATORY MARKERS  Sedimentation Rate, Erythrocyte: 20 mm/Hr (08-11-22 @ 12:17)  C-Reactive Protein, Serum: 3.3 mg/L (08-11-22 @ 12:17)      RADIOLOGY & ADDITIONAL TESTS:  I have personally reviewed the last available Chest xray  CXR      CT  CT Chest No Cont:   ACC: [id number] EXAM:  CT CHEST                          PROCEDURE DATE:  08/11/2022          INTERPRETATION:  Reason for Exam:  Dyspnea and wheezing.    CT of the chest was performed from the thoracic inlet to the level of the   adrenal glands without contrast injection. Coronal and sagittal images   have been submitted.    Comparison: None    Findings:    Tubes/Lines: None    Lungs, Pleura, and Airways:Trachea and central airways are patent.   Moderate to severe bilateral emphysematous changes.No consolidation,   pleural effusion or pneumothorax. No bronchiectasis or honeycombing.   Right upper lobe 0.6 cm solid nodule (series 4 image 75). Scattered   bilateral calcified granulomas.    Mediastinum/Lymph Nodes:No enlarged mediastinal, hilaror axillary lymph   nodes.    Heart/Great Vessels: No pericardial effusion. Extensive coronary   atherosclerosis. Thoracic aorta and main pulmonary artery are normal in   caliber.    Abdomen: Partially imaged upper abdomen demonstrates no acute abnormality.    Bones and soft tissues: No suspicious lytic or blastic lesions.   Degenerative changes of the spine.    IMPRESSION:    Moderate to severe bilateral emphysematous changes.    Right upper lobe 0.6 cm solid nodule. Consider follow-up in 6-12 months   to confirm stability/resolution.    --- End of Report ---            ELLIOT LANDAU MD; Attending Radiologist  This document has been electronically signed. Aug 12 2022  9:20AM (08-11-22 @ 18:44)      CARDIOLOGY TESTING  12 Lead ECG:   Ventricular Rate 94 BPM    Atrial Rate 94 BPM    P-R Interval 134 ms    QRS Duration 80 ms    Q-T Interval 338 ms    QTC Calculation(Bazett) 422 ms    P Axis 79 degrees    R Axis 70 degrees    T Axis 68 degrees    Diagnosis Line Sinus rhythm with occasional Premature ventricular complexes  Otherwise normal ECG    Confirmed by MARCOS BUSH MD ([id number]) on 8/10/2022 10:06:35 AM (08-10-22 @ 08:08)      MEDICATIONS  ALBUTerol    0.083%. 2.5 Nebulizer once  aspirin enteric coated 81 Oral daily  atorvastatin 20 Oral at bedtime  azithromycin   Tablet 250 Oral daily  benzonatate 100 Oral three times a day  budesonide 160 MICROgram(s)/formoterol 4.5 MICROgram(s) Inhaler 2 Inhalation two times a day  chlorhexidine 4% Liquid 1 Topical <User Schedule>  emtricitabine 200 mG/tenofovir 300 mG (TRUVADA) 1 Oral daily  enoxaparin Injectable 40 SubCutaneous every 24 hours  lisinopril 10 Oral daily  methylPREDNISolone sodium succinate Injectable 40 IV Push every 8 hours  nicotine -  14 mG/24Hr(s) Patch 1 Transdermal daily  rilpivirine 25 Oral with dinner  tiotropium 18 MICROgram(s) Capsule 1 Inhalation daily      WEIGHT  Weight (kg): 72.6 (08-10-22 @ 08:00)  Creatinine, Serum: 0.8 mg/dL (08-14-22 @ 09:14)      ANTIBIOTICS:  azithromycin   Tablet 250 milliGRAM(s) Oral daily  emtricitabine 200 mG/tenofovir 300 mG (TRUVADA) 1 Tablet(s) Oral daily  rilpivirine 25 milliGRAM(s) Oral with dinner      All available historical records have been reviewed      
MARY CLANCY 62y Male  MRN#: [id number]   CODE STATUS: Full      SUBJECTIVE  Patient is a 62y old Male who presents with a chief complaint of SOB (11 Aug 2022 15:40)  Currently admitted to medicine with the primary diagnosis of SOB (shortness of breath)    Today is hospital day 2d, and this morning he appeared in no acute distress. Patient did report feeling uncomfortable about multiple episodes recently of needing to reposition himself to improve shortness of breath, even though he is aware the pulse oximeter is recording normal numbers. No other medical concerns. Patient wanted to speak to pulmonology about his recent CT Chest findings. No acute events overnight    OBJECTIVE  PAST MEDICAL & SURGICAL HISTORY  CAD (coronary artery disease)    HTN (hypertension)    High cholesterol    HIV (human immunodeficiency virus infection)    No significant past surgical history      ALLERGIES:  No Known Allergies    MEDICATIONS:  STANDING MEDICATIONS  ALBUTerol    0.083%. 2.5 milliGRAM(s) Nebulizer once  aspirin enteric coated 81 milliGRAM(s) Oral daily  atorvastatin 20 milliGRAM(s) Oral at bedtime  azithromycin   Tablet 250 milliGRAM(s) Oral daily  benzonatate 100 milliGRAM(s) Oral three times a day  chlorhexidine 4% Liquid 1 Application(s) Topical <User Schedule>  emtricitabine 200 mG/tenofovir 300 mG (TRUVADA) 1 Tablet(s) Oral daily  enoxaparin Injectable 40 milliGRAM(s) SubCutaneous every 24 hours  lisinopril 10 milliGRAM(s) Oral daily  methylPREDNISolone sodium succinate Injectable 40 milliGRAM(s) IV Push every 8 hours  nicotine -  14 mG/24Hr(s) Patch 1 Patch Transdermal daily  remdesivir  IVPB   IV Intermittent   remdesivir  IVPB 100 milliGRAM(s) IV Intermittent every 24 hours  rilpivirine 25 milliGRAM(s) Oral with dinner  tiotropium 18 MICROgram(s) Capsule 1 Capsule(s) Inhalation daily    PRN MEDICATIONS  acetaminophen     Tablet .. 650 milliGRAM(s) Oral every 6 hours PRN  ALBUTerol    90 MICROgram(s) HFA Inhaler 1 Puff(s) Inhalation four times a day PRN      VITAL SIGNS: Last 24 Hours  T(C): 35.7 (12 Aug 2022 09:28), Max: 36 (12 Aug 2022 00:00)  T(F): 96.2 (12 Aug 2022 09:28), Max: 96.8 (12 Aug 2022 00:00)  HR: 68 (12 Aug 2022 09:28) (68 - 79)  BP: 146/72 (12 Aug 2022 09:28) (146/72 - 155/68)  RR: 18 (12 Aug 2022 09:28) (18 - 18)  SpO2: 98% (12 Aug 2022 09:28) (97% - 98%)    LABS:                        13.5   6.77  )-----------( 148      ( 11 Aug 2022 06:46 )             39.4     08-12    x   |  x   |  x   ----------------------------<  x   x    |  x   |  0.7    Ca    9.5      11 Aug 2022 06:46  Mg     2.3     08-11    TPro  6.7  /  Alb  4.1  /  TBili  0.3  /  DBili  <0.2  /  AST  22  /  ALT  18  /  AlkPhos  102  08-12    PT/INR - ( 12 Aug 2022 07:35 )   PT: 12.30 sec;   INR: 1.07 ratio           Culture - Blood (collected 10 Aug 2022 12:30)  Source: .Blood Blood  Preliminary Report (11 Aug 2022 23:01):    No growth to date.    Culture - Blood (collected 10 Aug 2022 12:30)  Source: .Blood Blood  Preliminary Report (11 Aug 2022 23:01):    No growth to date.          RADIOLOGY:  VA Duplex Lower Ext Vein Scan, Bilat (08.12.22 @ 08:47) >No evidence of deep venous thrombosis in either lower extremity.    CT Chest No Cont (08.11.22 @ 18:44) >  Moderate to severe bilateral emphysematous changes.    Right upper lobe 0.6 cm solid nodule. Consider follow-up in 6-12 months to confirm stability/resolution.    Xray Chest 1 View- PORTABLE-Urgent (08.10.22 @ 11:00) >No radiographic evidence of acute cardiopulmonary disease    Xray Chest 2 Views PA/Lat (07.11.18 @ 20:55) >No radiographic evidence of acute cardiopulmonary disease.        PHYSICAL EXAM:  GENERAL: NAD, well-developed  HEENT:  Atraumatic, Normocephalic. EOMI  PULMONARY: Breath sounds decreased throughout  CARDIOVASCULAR: Regular rate and rhythm; No murmurs, rubs, or gallops  GASTROINTESTINAL: Soft, Nontender, Nondistended; Bowel sounds present  MUSCULOSKELETAL:  No clubbing, cyanosis, or edema  NEUROLOGY: non-focal, normal gait and movement of extremities  SKIN: No rashes or lesions      ADMISSION SUMMARY  Patient is a 62y old Male who presents with a chief complaint of SOB (11 Aug 2022 15:40)  Currently admitted to medicine with the primary diagnosis of SOB (shortness of breath)      ASSESSMENT & PLAN  61 yo M PMHx HIV undetectable, CAD s/p PCI x1 2006, HTN, DLD, current smoker presents to ED c/o SOB for 3 months, also found to be COVID positive.    #SOB - Post-viral bronchitis/COPD Exacerbation  - Prednisone 40 mg daily   - azithromycin 250 mg daily for 5 days   - TTE performed, awaiting results    #Suspected COPD  - Pulmonary Consulted, following up with patient  - CT Chest - found Moderate to severe bilateral emphysematous changes  - Spiriva ordered    #COVID-19 - mild  - can start  mg x 1, followed by 100 mg daily for at least 3 days   - continue Prednisone 40 mg daily     #HIV  - continue Truvada daily  - continue rilpivirin 25 mg daily    #CAD  - continue atorvastatin 20 mg daily  - continue aspirin 81 mg daily    #HTN  - continue lisinopril 10 mg daily    #HLD  - continue atorvastatin 20 mg daily    Dispo: Acute
Patient is a 62y old  Male who presents with a chief complaint of SOB (12 Aug 2022 12:51)        SUBJECTIVE:    Feels ok   On room air   CT chest showed emphysema   There was also a RUL small lung nodule of 6mm       REVIEW OF SYSTEMS:  See HPI    PHYSICAL EXAM  Vital Signs Last 24 Hrs  T(C): 36.1 (12 Aug 2022 15:17), Max: 36.1 (12 Aug 2022 15:17)  T(F): 97 (12 Aug 2022 15:17), Max: 97 (12 Aug 2022 15:17)  HR: 76 (12 Aug 2022 15:17) (68 - 79)  BP: 146/72 (12 Aug 2022 15:17) (146/72 - 148/70)  BP(mean): --  RR: 18 (12 Aug 2022 15:17) (18 - 18)  SpO2: 98% (12 Aug 2022 09:28) (97% - 98%)    Parameters below as of 12 Aug 2022 09:28  Patient On (Oxygen Delivery Method): room air        General: In NAD  HEENT: YANN               Lymphatic system: No Cervical LN    Respiratory: Daniel BS, wheezing improved   Cardiovascular: Regular  Gastrointestinal: Soft. + BS  Musculoskeletal: No clubbing.  moves all extremities.  Full range of motion    Skin: Warm.  Intact  Neurological: No motor or sensory deficit      08-11-22 @ 07:01  -  08-12-22 @ 07:00  --------------------------------------------------------  IN:    Oral Fluid: 360 mL  Total IN: 360 mL    OUT:  Total OUT: 0 mL    Total NET: 360 mL          LABS:                          13.5   6.77  )-----------( 148      ( 11 Aug 2022 06:46 )             39.4                                               08-12    x   |  x   |  x   ----------------------------<  x   x    |  x   |  0.7    Ca    9.5      11 Aug 2022 06:46  Mg     2.3     08-11    TPro  6.7  /  Alb  4.1  /  TBili  0.3  /  DBili  <0.2  /  AST  22  /  ALT  18  /  AlkPhos  102  08-12      PT/INR - ( 12 Aug 2022 07:35 )   PT: 12.30 sec;   INR: 1.07 ratio                                                                                              LIVER FUNCTIONS - ( 12 Aug 2022 07:35 )  Alb: 4.1 g/dL / Pro: 6.7 g/dL / ALK PHOS: 102 U/L / ALT: 18 U/L / AST: 22 U/L / GGT: x                                                  Culture - Blood (collected 10 Aug 2022 12:30)  Source: .Blood Blood  Preliminary Report (11 Aug 2022 23:01):    No growth to date.    Culture - Blood (collected 10 Aug 2022 12:30)  Source: .Blood Blood  Preliminary Report (11 Aug 2022 23:01):    No growth to date.                                                    MEDICATIONS  (STANDING):  ALBUTerol    0.083%. 2.5 milliGRAM(s) Nebulizer once  aspirin enteric coated 81 milliGRAM(s) Oral daily  atorvastatin 20 milliGRAM(s) Oral at bedtime  azithromycin   Tablet 250 milliGRAM(s) Oral daily  benzonatate 100 milliGRAM(s) Oral three times a day  chlorhexidine 4% Liquid 1 Application(s) Topical <User Schedule>  emtricitabine 200 mG/tenofovir 300 mG (TRUVADA) 1 Tablet(s) Oral daily  enoxaparin Injectable 40 milliGRAM(s) SubCutaneous every 24 hours  lisinopril 10 milliGRAM(s) Oral daily  methylPREDNISolone sodium succinate Injectable 40 milliGRAM(s) IV Push every 8 hours  nicotine -  14 mG/24Hr(s) Patch 1 Patch Transdermal daily  remdesivir  IVPB   IV Intermittent   remdesivir  IVPB 100 milliGRAM(s) IV Intermittent every 24 hours  rilpivirine 25 milliGRAM(s) Oral with dinner  tiotropium 18 MICROgram(s) Capsule 1 Capsule(s) Inhalation daily    MEDICATIONS  (PRN):  acetaminophen     Tablet .. 650 milliGRAM(s) Oral every 6 hours PRN Mild Pain (1 - 3)  ALBUTerol    90 MICROgram(s) HFA Inhaler 1 Puff(s) Inhalation four times a day PRN Bronchospasm      
RADHAMARY MCFARLAND  62y, Male  Allergy: No Known Allergies      LOS  2d    CHIEF COMPLAINT: SOB (12 Aug 2022 17:24)      INTERVAL EVENTS/HPI  - No acute events overnight  - T(F): , Max: 97 (08-12-22 @ 15:17)  - still short of breath exertion   - WBC Count: 6.77 (08-11-22 @ 06:46)  WBC Count: 4.12 (08-10-22 @ 09:30)     - Creatinine, Serum: 0.7 (08-12-22 @ 07:35)  Creatinine, Serum: 0.8 (08-11-22 @ 21:26)       ROS  General: Denies rigors, nightsweats  HEENT: Denies headache, rhinorrhea, sore throat, eye pain  CV: Denies CP, palpitations  PULM: Denies wheezing, hemoptysis  GI: Denies hematemesis, hematochezia, melena  : Denies discharge, hematuria  MSK: Denies arthralgias, myalgias  SKIN: Denies rash, lesions  NEURO: Denies paresthesias, weakness  PSYCH: Denies depression, anxiety    VITALS:  T(F): 97, Max: 97 (08-12-22 @ 15:17)  HR: 76  BP: 146/72  RR: 18Vital Signs Last 24 Hrs  T(C): 36.1 (12 Aug 2022 15:17), Max: 36.1 (12 Aug 2022 15:17)  T(F): 97 (12 Aug 2022 15:17), Max: 97 (12 Aug 2022 15:17)  HR: 76 (12 Aug 2022 15:17) (68 - 79)  BP: 146/72 (12 Aug 2022 15:17) (146/72 - 148/70)  BP(mean): --  RR: 18 (12 Aug 2022 15:17) (18 - 18)  SpO2: 98% (12 Aug 2022 09:28) (97% - 98%)    Parameters below as of 12 Aug 2022 09:28  Patient On (Oxygen Delivery Method): room air        PHYSICAL EXAM:  Gen: NAD, resting in bed  HEENT: Normocephalic, atraumatic  Neck: supple, no lymphadenopathy  CV: Regular rate & regular rhythm  Lungs: decreased BS at bases, no fremitus  Abdomen: Soft, BS present  Ext: Warm, well perfused  Neuro: non focal, awake  Skin: no rash, no erythema  Lines: no phlebitis    FH: Non-contributory  Social Hx: Non-contributory    TESTS & MEASUREMENTS:                        13.5   6.77  )-----------( 148      ( 11 Aug 2022 06:46 )             39.4     08-12    x   |  x   |  x   ----------------------------<  x   x    |  x   |  0.7    Ca    9.5      11 Aug 2022 06:46  Mg     2.3     08-11    TPro  6.7  /  Alb  4.1  /  TBili  0.3  /  DBili  <0.2  /  AST  22  /  ALT  18  /  AlkPhos  102  08-12      LIVER FUNCTIONS - ( 12 Aug 2022 07:35 )  Alb: 4.1 g/dL / Pro: 6.7 g/dL / ALK PHOS: 102 U/L / ALT: 18 U/L / AST: 22 U/L / GGT: x               Culture - Blood (collected 08-10-22 @ 12:30)  Source: .Blood Blood  Preliminary Report (08-11-22 @ 23:01):    No growth to date.    Culture - Blood (collected 08-10-22 @ 12:30)  Source: .Blood Blood  Preliminary Report (08-11-22 @ 23:01):    No growth to date.        Lactate, Blood: 1.2 mmol/L (08-10-22 @ 09:15)      INFECTIOUS DISEASES TESTING  Procalcitonin, Serum: <0.02 (08-11-22 @ 12:17)      INFLAMMATORY MARKERS  Sedimentation Rate, Erythrocyte: 20 mm/Hr (08-11-22 @ 12:17)  C-Reactive Protein, Serum: 3.3 mg/L (08-11-22 @ 12:17)      RADIOLOGY & ADDITIONAL TESTS:  I have personally reviewed the last available Chest xray  CXR  Xray Chest 1 View- PORTABLE-Urgent:   ACC: [id number] EXAM:  XR CHEST PORTABLE URGENT 1V                          PROCEDURE DATE:  08/10/2022          INTERPRETATION:  Clinical History / Reason for exam: Shortness of breath.    Comparison : Chest radiograph 7/11/2018.    Technique/Positioning: Frontal views of the chest.    Findings:    Support devices: None.    Cardiac/mediastinum/hilum: Unremarkable.    Lung parenchyma/Pleura: Within normal limits.    Skeleton/soft tissues: Minimal thoracic spondylosis    Impression:    No radiographic evidence of acute cardiopulmonary disease.        --- End of Report ---            SOFÍA ESCALANTE MD; Attending Radiologist  This document has been electronically signed. Aug 10 2022 11:56AM (08-10-22 @ 11:00)      CT  CT Chest No Cont:   ACC: [id number] EXAM:  CT CHEST                          PROCEDURE DATE:  08/11/2022          INTERPRETATION:  Reason for Exam:  Dyspnea and wheezing.    CT of the chest was performed from the thoracic inlet to the level of the   adrenal glands without contrast injection. Coronal and sagittal images   have been submitted.    Comparison: None    Findings:    Tubes/Lines: None    Lungs, Pleura, and Airways:Trachea and central airways are patent.   Moderate to severe bilateral emphysematous changes.No consolidation,   pleural effusion or pneumothorax. No bronchiectasis or honeycombing.   Right upper lobe 0.6 cm solid nodule (series 4 image 75). Scattered   bilateral calcified granulomas.    Mediastinum/Lymph Nodes:No enlarged mediastinal, hilaror axillary lymph   nodes.    Heart/Great Vessels: No pericardial effusion. Extensive coronary   atherosclerosis. Thoracic aorta and main pulmonary artery are normal in   caliber.    Abdomen: Partially imaged upper abdomen demonstrates no acute abnormality.    Bones and soft tissues: No suspicious lytic or blastic lesions.   Degenerative changes of the spine.    IMPRESSION:    Moderate to severe bilateral emphysematous changes.    Right upper lobe 0.6 cm solid nodule. Consider follow-up in 6-12 months   to confirm stability/resolution.    --- End of Report ---            ELLIOT LANDAU MD; Attending Radiologist  This document has been electronically signed. Aug 12 2022  9:20AM (08-11-22 @ 18:44)      CARDIOLOGY TESTING  12 Lead ECG:   Ventricular Rate 94 BPM    Atrial Rate 94 BPM    P-R Interval 134 ms    QRS Duration 80 ms    Q-T Interval 338 ms    QTC Calculation(Bazett) 422 ms    P Axis 79 degrees    R Axis 70 degrees    T Axis 68 degrees    Diagnosis Line Sinus rhythm with occasional Premature ventricular complexes  Otherwise normal ECG    Confirmed by MARCOS BUSH MD ([id number]) on 8/10/2022 10:06:35 AM (08-10-22 @ 08:08)      MEDICATIONS  ALBUTerol    0.083%. 2.5 Nebulizer once  aspirin enteric coated 81 Oral daily  atorvastatin 20 Oral at bedtime  azithromycin   Tablet 250 Oral daily  benzonatate 100 Oral three times a day  chlorhexidine 4% Liquid 1 Topical <User Schedule>  emtricitabine 200 mG/tenofovir 300 mG (TRUVADA) 1 Oral daily  enoxaparin Injectable 40 SubCutaneous every 24 hours  lisinopril 10 Oral daily  methylPREDNISolone sodium succinate Injectable 40 IV Push every 8 hours  nicotine -  14 mG/24Hr(s) Patch 1 Transdermal daily  remdesivir  IVPB  IV Intermittent   remdesivir  IVPB 100 IV Intermittent every 24 hours  rilpivirine 25 Oral with dinner  tiotropium 18 MICROgram(s) Capsule 1 Inhalation daily      WEIGHT  Weight (kg): 72.6 (08-10-22 @ 08:00)  Creatinine, Serum: 0.7 mg/dL (08-12-22 @ 07:35)  Creatinine, Serum: 0.8 mg/dL (08-11-22 @ 21:26)      ANTIBIOTICS:  azithromycin   Tablet 250 milliGRAM(s) Oral daily  emtricitabine 200 mG/tenofovir 300 mG (TRUVADA) 1 Tablet(s) Oral daily  remdesivir  IVPB   IV Intermittent   remdesivir  IVPB 100 milliGRAM(s) IV Intermittent every 24 hours  rilpivirine 25 milliGRAM(s) Oral with dinner      All available historical records have been reviewed      
SUSANNAMARY EVANS  62y, Male  Allergy: No Known Allergies      LOS  5d    CHIEF COMPLAINT: SOB (14 Aug 2022 18:16)      INTERVAL EVENTS/HPI  - No acute events overnight  - T(F): , Max: 97.1 (08-14-22 @ 23:53)  - Denies any worsening symptoms  - Tolerating medication  -    - Creatinine, Serum: 0.8 (08-15-22 @ 07:49)  Creatinine, Serum: 0.8 (08-14-22 @ 09:14)       ROS  General: Denies rigors, nightsweats  HEENT: Denies headache, rhinorrhea, sore throat, eye pain  CV: Denies CP, palpitations  PULM: Denies wheezing, hemoptysis  GI: Denies hematemesis, hematochezia, melena  : Denies discharge, hematuria  MSK: Denies arthralgias, myalgias  SKIN: Denies rash, lesions  NEURO: Denies paresthesias, weakness  PSYCH: Denies depression, anxiety    VITALS:  T(F): 96.8, Max: 97.1 (08-14-22 @ 23:53)  HR: 70  BP: 130/65  RR: 18Vital Signs Last 24 Hrs  T(C): 36 (15 Aug 2022 15:27), Max: 36.2 (14 Aug 2022 23:53)  T(F): 96.8 (15 Aug 2022 15:27), Max: 97.1 (14 Aug 2022 23:53)  HR: 70 (15 Aug 2022 15:27) (70 - 78)  BP: 130/65 (15 Aug 2022 15:27) (129/65 - 139/76)  BP(mean): --  RR: 18 (15 Aug 2022 15:27) (18 - 18)  SpO2: 92% (15 Aug 2022 15:27) (92% - 95%)    Parameters below as of 15 Aug 2022 15:27  Patient On (Oxygen Delivery Method): room air        PHYSICAL EXAM:  Gen: NAD, resting in bed  HEENT: Normocephalic, atraumatic  Neck: supple, no lymphadenopathy  CV: Regular rate & regular rhythm  Lungs: decreased BS at bases, no fremitus  Abdomen: Soft, BS present  Ext: Warm, well perfused  Neuro: non focal, awake  Skin: no rash, no erythema  Lines: no phlebitis    FH: Non-contributory  Social Hx: Non-contributory    TESTS & MEASUREMENTS:    08-15    x   |  x   |  x   ----------------------------<  x   x    |  x   |  0.8      TPro  6.7  /  Alb  4.2  /  TBili  0.3  /  DBili  <0.2  /  AST  33  /  ALT  72<H>  /  AlkPhos  93  08-15      LIVER FUNCTIONS - ( 15 Aug 2022 07:49 )  Alb: 4.2 g/dL / Pro: 6.7 g/dL / ALK PHOS: 93 U/L / ALT: 72 U/L / AST: 33 U/L / GGT: x               Culture - Blood (collected 08-10-22 @ 12:30)  Source: .Blood Blood  Preliminary Report (08-11-22 @ 23:01):    No growth to date.    Culture - Blood (collected 08-10-22 @ 12:30)  Source: .Blood Blood  Preliminary Report (08-11-22 @ 23:01):    No growth to date.            INFECTIOUS DISEASES TESTING  Procalcitonin, Serum: <0.02 (08-11-22 @ 12:17)      INFLAMMATORY MARKERS  Sedimentation Rate, Erythrocyte: 20 mm/Hr (08-11-22 @ 12:17)  C-Reactive Protein, Serum: 3.3 mg/L (08-11-22 @ 12:17)      RADIOLOGY & ADDITIONAL TESTS:  I have personally reviewed the last available Chest xray  CXR      CT      CARDIOLOGY TESTING  12 Lead ECG:   Ventricular Rate 94 BPM    Atrial Rate 94 BPM    P-R Interval 134 ms    QRS Duration 80 ms    Q-T Interval 338 ms    QTC Calculation(Bazett) 422 ms    P Axis 79 degrees    R Axis 70 degrees    T Axis 68 degrees    Diagnosis Line Sinus rhythm with occasional Premature ventricular complexes  Otherwise normal ECG    Confirmed by MARCOS BUSH MD ([id number]) on 8/10/2022 10:06:35 AM (08-10-22 @ 08:08)      MEDICATIONS  aspirin enteric coated 81 Oral daily  atorvastatin 20 Oral at bedtime  azithromycin   Tablet 250 Oral daily  benzonatate 100 Oral three times a day  budesonide 160 MICROgram(s)/formoterol 4.5 MICROgram(s) Inhaler 2 Inhalation two times a day  chlorhexidine 4% Liquid 1 Topical <User Schedule>  emtricitabine 200 mG/tenofovir 300 mG (TRUVADA) 1 Oral daily  enoxaparin Injectable 40 SubCutaneous every 24 hours  lisinopril 10 Oral daily  methylPREDNISolone sodium succinate Injectable 40 IV Push every 8 hours  nicotine -  14 mG/24Hr(s) Patch 1 Transdermal daily  rilpivirine 25 Oral with dinner  tiotropium 18 MICROgram(s) Capsule 1 Inhalation daily      WEIGHT  Weight (kg): 72.6 (08-15-22 @ 14:38)  Creatinine, Serum: 0.8 mg/dL (08-15-22 @ 07:49)      ANTIBIOTICS:  azithromycin   Tablet 250 milliGRAM(s) Oral daily  emtricitabine 200 mG/tenofovir 300 mG (TRUVADA) 1 Tablet(s) Oral daily  rilpivirine 25 milliGRAM(s) Oral with dinner      All available historical records have been reviewed      
SUSANNAMARY EVANS  62y, Male  Allergy: No Known Allergies      LOS  3d    CHIEF COMPLAINT: SOB (13 Aug 2022 11:16)      INTERVAL EVENTS/HPI  - No acute events overnight  - T(F): , Max: 97 (08-13-22 @ 15:24)  - WBC Count: 6.77 (08-11-22 @ 06:46)  - no measured hypoxia but still with dyspnea on exertion and when sleeping   - Creatinine, Serum: 0.7 (08-13-22 @ 07:42)  Creatinine, Serum: 0.7 (08-12-22 @ 07:35)       ROS  General: Denies rigors, nightsweats  HEENT: Denies headache, rhinorrhea, sore throat, eye pain  CV: Denies CP, palpitations  PULM: Denies wheezing, hemoptysis  GI: Denies hematemesis, hematochezia, melena  : Denies discharge, hematuria  MSK: Denies arthralgias, myalgias  SKIN: Denies rash, lesions  NEURO: Denies paresthesias, weakness  PSYCH: Denies depression, anxiety    VITALS:  T(F): 97, Max: 97 (08-13-22 @ 15:24)  HR: 72  BP: 130/66  RR: 18Vital Signs Last 24 Hrs  T(C): 36.1 (13 Aug 2022 15:24), Max: 36.1 (13 Aug 2022 15:24)  T(F): 97 (13 Aug 2022 15:24), Max: 97 (13 Aug 2022 15:24)  HR: 72 (13 Aug 2022 15:24) (62 - 72)  BP: 130/66 (13 Aug 2022 15:24) (113/68 - 135/73)  BP(mean): --  RR: 18 (13 Aug 2022 18:00) (18 - 18)  SpO2: 96% (13 Aug 2022 18:00) (96% - 96%)    Parameters below as of 13 Aug 2022 18:00  Patient On (Oxygen Delivery Method): room air        PHYSICAL EXAM:  Gen: NAD, resting in bed  HEENT: Normocephalic, atraumatic  Neck: supple, no lymphadenopathy  CV: Regular rate & regular rhythm  Lungs: decreased BS at bases, no fremitus  Abdomen: Soft, BS present  Ext: Warm, well perfused  Neuro: non focal, awake  Skin: no rash, no erythema  Lines: no phlebitis    FH: Non-contributory  Social Hx: Non-contributory    TESTS & MEASUREMENTS:    08-13    x   |  x   |  x   ----------------------------<  x   x    |  x   |  0.7      TPro  7.1  /  Alb  4.2  /  TBili  0.3  /  DBili  <0.2  /  AST  21  /  ALT  24  /  AlkPhos  98  08-13      LIVER FUNCTIONS - ( 13 Aug 2022 07:42 )  Alb: 4.2 g/dL / Pro: 7.1 g/dL / ALK PHOS: 98 U/L / ALT: 24 U/L / AST: 21 U/L / GGT: x               Culture - Blood (collected 08-10-22 @ 12:30)  Source: .Blood Blood  Preliminary Report (08-11-22 @ 23:01):    No growth to date.    Culture - Blood (collected 08-10-22 @ 12:30)  Source: .Blood Blood  Preliminary Report (08-11-22 @ 23:01):    No growth to date.        Lactate, Blood: 1.2 mmol/L (08-10-22 @ 09:15)      INFECTIOUS DISEASES TESTING  Procalcitonin, Serum: <0.02 (08-11-22 @ 12:17)      INFLAMMATORY MARKERS  Sedimentation Rate, Erythrocyte: 20 mm/Hr (08-11-22 @ 12:17)  C-Reactive Protein, Serum: 3.3 mg/L (08-11-22 @ 12:17)      RADIOLOGY & ADDITIONAL TESTS:  I have personally reviewed the last available Chest xray  CXR      CT  CT Chest No Cont:   ACC: [id number] EXAM:  CT CHEST                          PROCEDURE DATE:  08/11/2022          INTERPRETATION:  Reason for Exam:  Dyspnea and wheezing.    CT of the chest was performed from the thoracic inlet to the level of the   adrenal glands without contrast injection. Coronal and sagittal images   have been submitted.    Comparison: None    Findings:    Tubes/Lines: None    Lungs, Pleura, and Airways:Trachea and central airways are patent.   Moderate to severe bilateral emphysematous changes.No consolidation,   pleural effusion or pneumothorax. No bronchiectasis or honeycombing.   Right upper lobe 0.6 cm solid nodule (series 4 image 75). Scattered   bilateral calcified granulomas.    Mediastinum/Lymph Nodes:No enlarged mediastinal, hilaror axillary lymph   nodes.    Heart/Great Vessels: No pericardial effusion. Extensive coronary   atherosclerosis. Thoracic aorta and main pulmonary artery are normal in   caliber.    Abdomen: Partially imaged upper abdomen demonstrates no acute abnormality.    Bones and soft tissues: No suspicious lytic or blastic lesions.   Degenerative changes of the spine.    IMPRESSION:    Moderate to severe bilateral emphysematous changes.    Right upper lobe 0.6 cm solid nodule. Consider follow-up in 6-12 months   to confirm stability/resolution.    --- End of Report ---            ELLIOT LANDAU MD; Attending Radiologist  This document has been electronically signed. Aug 12 2022  9:20AM (08-11-22 @ 18:44)      CARDIOLOGY TESTING  12 Lead ECG:   Ventricular Rate 94 BPM    Atrial Rate 94 BPM    P-R Interval 134 ms    QRS Duration 80 ms    Q-T Interval 338 ms    QTC Calculation(Bazett) 422 ms    P Axis 79 degrees    R Axis 70 degrees    T Axis 68 degrees    Diagnosis Line Sinus rhythm with occasional Premature ventricular complexes  Otherwise normal ECG    Confirmed by MARCOS BUSH MD ([id number]) on 8/10/2022 10:06:35 AM (08-10-22 @ 08:08)      MEDICATIONS  ALBUTerol    0.083%. 2.5 Nebulizer once  aspirin enteric coated 81 Oral daily  atorvastatin 20 Oral at bedtime  azithromycin   Tablet 250 Oral daily  benzonatate 100 Oral three times a day  budesonide 160 MICROgram(s)/formoterol 4.5 MICROgram(s) Inhaler 2 Inhalation two times a day  chlorhexidine 4% Liquid 1 Topical <User Schedule>  emtricitabine 200 mG/tenofovir 300 mG (TRUVADA) 1 Oral daily  enoxaparin Injectable 40 SubCutaneous every 24 hours  lisinopril 10 Oral daily  methylPREDNISolone sodium succinate Injectable 40 IV Push every 8 hours  nicotine -  14 mG/24Hr(s) Patch 1 Transdermal daily  remdesivir  IVPB  IV Intermittent   remdesivir  IVPB 100 IV Intermittent every 24 hours  rilpivirine 25 Oral with dinner  tiotropium 18 MICROgram(s) Capsule 1 Inhalation daily      WEIGHT  Weight (kg): 72.6 (08-10-22 @ 08:00)  Creatinine, Serum: 0.7 mg/dL (08-13-22 @ 07:42)      ANTIBIOTICS:  azithromycin   Tablet 250 milliGRAM(s) Oral daily  emtricitabine 200 mG/tenofovir 300 mG (TRUVADA) 1 Tablet(s) Oral daily  remdesivir  IVPB   IV Intermittent   remdesivir  IVPB 100 milliGRAM(s) IV Intermittent every 24 hours  rilpivirine 25 milliGRAM(s) Oral with dinner      All available historical records have been reviewed

## 2022-08-15 NOTE — PROGRESS NOTE ADULT - ASSESSMENT
ASSESSMENT  63 yo M PMHx HIV undetectable, CAD s/p PCI x1 2006, HTN, DLD, current smoker presents to ED c/o SOB x 3ms      IMPRESSION  #SOB - Post-viral bronchitis/COPD Exacerbation  - Prednisone 40 mg daily followed by taper   - azithromycin 250 mg daily for 5 days (day 5/5)  - TTE 8/14 - no significant valvulopathy, normal EF, no evidence of Pulmonary HTN     #Severe COPD/Emphysema   - CT Chest No Cont (08.11.22 @ 18:44): Moderate to severe bilateral emphysematous changes. Right upper lobe 0.6 cm solid nodule. Consider follow-up in 6-12 months  to confirm stability/resolution.  - appreciate pulm consult  - symbicort BID   - spiriva daily   - dujuan PRN    #COVID-19 - mild  - can start  mg x 1, followed by 100 mg daily for at least 3 days     #HIV  - continue Truvada daily  - continue rilpivirin 25 mg daily  - follows with Dr Becerril    #CAD  - continue atorvastatin 20 mg daily  - continue aspirin 81 mg daily    #Hypertension  - continue lisinopril 10 mg daily    #Hyperlipidemia  - continue atorvastatin 20 mg daily    Dispo: stable for discharge today

## 2022-08-23 NOTE — CDI QUERY NOTE - NSCDIOTHERTXTBX_GEN_ALL_CORE_HH
DOCUMENTATION CLARIFICATION FORM     Encounter #: [id number]                                    Patient’s Name: Basilio Amaro  Medical Record #: [id number]                          Admit Date: 8-  : 2022                                              Discharged: 8-  CDI Specialist/: Aris                         Contact #: [phone]    Dear Dr. Glynn,                         Date: 2022                 The Physician’s or Provider’s documentation of the patient’s presentation, evaluation and  medical management, as identified below, may support a diagnosis that is not documented in the medical record.  In order to accurately capture all diagnoses to the greatest degree of specificity reflecting the patient’s actual severity of illness, the documentation in this patient’s medical record requires additional clarification.  Please include more specific documentation, either known or suspected, of a corresponding diagnosis associated with the clinical information described below in your Progress Note and/or Discharge Summary.    CLINICAL INDICATORS  •	8-10 H&P:... PMHx HIV undetectable... c/o SOB x 3ms... HIV unknown CD4 count- Reportedly undetectable VL....F/u CD4, HIV VL  •	8-11 ID Consult:... HIV...compliant with HIV meds...     LABS  •	8-10 ABS CD4: 57 /uL, CD4 %: 12 %    TREATMENT  •	8-10 Truvada PO daily  •	8-10 rilpivirin 25mg PO daily    QUERY:  Based on your professional judgment and the clinical indicators, please clarify if the CD4 laboratory result can be further specified as:  •	CD4 report of 57/uL associated with HIV disease  •	CD4 report of 57/uL associated with symptomatic HIV infection  •	Other (please specify):  •	Unable to clinically further specify the CD4 report of 57/uL    Documentation clarification is required for compliance, accuracy in coding and billing, and reporting severity of illness, quality data   and risk of mortality.  --------------------------------------------------------------------------------------------------------------------------------------------  DO NOT REMOVE THIS RECORD WITHOUT FIRST NOTIFYING THE CDI SPECIALIST  This form is NOT a part of the permanent Medical Record.

## 2022-08-28 NOTE — CHART NOTE - NSCHARTNOTEFT_GEN_A_CORE
CDI Query:    CD4 report of 57/uL associated with symptomatic HIV infection    Please call or message on [employer] Teams if with any questions.  Spectra [phone]

## 2022-08-30 DIAGNOSIS — B20 HUMAN IMMUNODEFICIENCY VIRUS [HIV] DISEASE: ICD-10-CM

## 2022-08-30 DIAGNOSIS — I25.10 ATHEROSCLEROTIC HEART DISEASE OF NATIVE CORONARY ARTERY WITHOUT ANGINA PECTORIS: ICD-10-CM

## 2022-08-30 DIAGNOSIS — I10 ESSENTIAL (PRIMARY) HYPERTENSION: ICD-10-CM

## 2022-08-30 DIAGNOSIS — U07.1 COVID-19: ICD-10-CM

## 2022-08-30 DIAGNOSIS — Z79.82 LONG TERM (CURRENT) USE OF ASPIRIN: ICD-10-CM

## 2022-08-30 DIAGNOSIS — Z95.5 PRESENCE OF CORONARY ANGIOPLASTY IMPLANT AND GRAFT: ICD-10-CM

## 2022-08-30 DIAGNOSIS — F17.210 NICOTINE DEPENDENCE, CIGARETTES, UNCOMPLICATED: ICD-10-CM

## 2022-08-30 DIAGNOSIS — E78.5 HYPERLIPIDEMIA, UNSPECIFIED: ICD-10-CM

## 2022-08-30 DIAGNOSIS — R91.1 SOLITARY PULMONARY NODULE: ICD-10-CM

## 2022-08-30 DIAGNOSIS — Z79.52 LONG TERM (CURRENT) USE OF SYSTEMIC STEROIDS: ICD-10-CM

## 2022-08-30 DIAGNOSIS — J43.9 EMPHYSEMA, UNSPECIFIED: ICD-10-CM

## 2022-08-30 DIAGNOSIS — J20.8 ACUTE BRONCHITIS DUE TO OTHER SPECIFIED ORGANISMS: ICD-10-CM

## 2022-09-26 ENCOUNTER — APPOINTMENT (OUTPATIENT)
Dept: PULMONOLOGY | Facility: CLINIC | Age: 62
End: 2022-09-26

## 2022-09-26 VITALS
HEART RATE: 86 BPM | BODY MASS INDEX: 23.99 KG/M2 | DIASTOLIC BLOOD PRESSURE: 70 MMHG | SYSTOLIC BLOOD PRESSURE: 120 MMHG | WEIGHT: 162 LBS | OXYGEN SATURATION: 96 % | HEIGHT: 69 IN

## 2022-09-26 DIAGNOSIS — E78.5 HYPERLIPIDEMIA, UNSPECIFIED: ICD-10-CM

## 2022-09-26 DIAGNOSIS — Z86.79 PERSONAL HISTORY OF OTHER DISEASES OF THE CIRCULATORY SYSTEM: ICD-10-CM

## 2022-09-26 DIAGNOSIS — B20 HUMAN IMMUNODEFICIENCY VIRUS [HIV] DISEASE: ICD-10-CM

## 2022-09-26 DIAGNOSIS — F17.210 NICOTINE DEPENDENCE, CIGARETTES, UNCOMPLICATED: ICD-10-CM

## 2022-09-26 PROBLEM — Z00.00 ENCOUNTER FOR PREVENTIVE HEALTH EXAMINATION: Status: ACTIVE | Noted: 2022-09-26

## 2022-09-26 PROCEDURE — 99214 OFFICE O/P EST MOD 30 MIN: CPT

## 2022-09-26 RX ORDER — BUDESONIDE AND FORMOTEROL FUMARATE DIHYDRATE 80; 4.5 UG/1; UG/1
80-4.5 AEROSOL RESPIRATORY (INHALATION) TWICE DAILY
Qty: 1 | Refills: 3 | Status: ACTIVE | COMMUNITY
Start: 2022-09-26 | End: 1900-01-01

## 2022-09-26 NOTE — REASON FOR VISIT
[Follow-Up] : a follow-up visit [COPD] : COPD [Emphysema] : emphysema [Pulmonary Nodules] : pulmonary nodules

## 2022-09-26 NOTE — HISTORY OF PRESENT ILLNESS
[TextBox_4] : 62-year-old man with recent admission to the hospital for shortness of/COPD exacerbation, heavy smoker who recently quit about a month ago, history of HIV with I have a low viral level, hypertension, hyperlipidemia, CAD/PCI/stent in the past, currently presenting to the office for follow-up.  He is currently on Symbicort and Spiriva and feels his shortness of breath is better.  His lungs are clear today on exam.  O2 saturation is 96%.  During his hospitalization CT of the chest was done showing a pulmonary nodule measuring 6 mm.

## 2022-12-01 ENCOUNTER — OUTPATIENT (OUTPATIENT)
Dept: OUTPATIENT SERVICES | Facility: HOSPITAL | Age: 62
LOS: 1 days | Discharge: HOME | End: 2022-12-01

## 2022-12-01 DIAGNOSIS — R91.8 OTHER NONSPECIFIC ABNORMAL FINDING OF LUNG FIELD: ICD-10-CM

## 2022-12-01 PROCEDURE — 71250 CT THORAX DX C-: CPT | Mod: 26

## 2023-01-09 ENCOUNTER — APPOINTMENT (OUTPATIENT)
Dept: PULMONOLOGY | Facility: CLINIC | Age: 63
End: 2023-01-09
Payer: MEDICAID

## 2023-01-09 VITALS
SYSTOLIC BLOOD PRESSURE: 116 MMHG | OXYGEN SATURATION: 97 % | DIASTOLIC BLOOD PRESSURE: 80 MMHG | HEART RATE: 89 BPM | RESPIRATION RATE: 14 BRPM | HEIGHT: 69 IN | WEIGHT: 172 LBS | BODY MASS INDEX: 25.48 KG/M2

## 2023-01-09 PROCEDURE — 99214 OFFICE O/P EST MOD 30 MIN: CPT

## 2023-01-09 RX ORDER — BUDESONIDE AND FORMOTEROL FUMARATE DIHYDRATE 160; 4.5 UG/1; UG/1
160-4.5 AEROSOL RESPIRATORY (INHALATION) TWICE DAILY
Qty: 1 | Refills: 2 | Status: ACTIVE | COMMUNITY
Start: 2023-01-09 | End: 1900-01-01

## 2023-01-09 RX ORDER — TIOTROPIUM BROMIDE INHALATION SPRAY 3.12 UG/1
2.5 SPRAY, METERED RESPIRATORY (INHALATION) DAILY
Qty: 1 | Refills: 3 | Status: ACTIVE | COMMUNITY
Start: 2023-01-09 | End: 1900-01-01

## 2023-01-09 NOTE — HISTORY OF PRESENT ILLNESS
[TextBox_4] : 62-year-old man with recent admission to the hospital for shortness of/COPD exacerbation, heavy smoker who recently quit about a month ago, history of HIV with I have a low viral level, hypertension, hyperlipidemia, CAD/PCI/stent in the past, currently presenting to the office for follow-up.  He is currently on Symbicort and Spiriva and feels his shortness of breath is better.  His lungs are clear today on exam.  O2 saturation is 96%.  During his hospitalization CT of the chest was done showing a pulmonary nodule measuring 6 mm.\par \par 1/9/23: Unfortunately he is still smoking.  He is still wheezing on exam.  He remains on Symbicort twice daily and Spiriva daily.  PFTs not yet done.  Repeat CT of the chest reviewed with stable 6 mm right upper lobe lung nodule.  He was prescribed Chantix by his primary care physician which is appropriate.  The plan is to repeat CT of the chest in 6 months.  We will follow-up.

## 2023-03-21 ENCOUNTER — OUTPATIENT (OUTPATIENT)
Dept: OUTPATIENT SERVICES | Facility: HOSPITAL | Age: 63
LOS: 1 days | End: 2023-03-21
Payer: MEDICAID

## 2023-03-21 DIAGNOSIS — Z00.8 ENCOUNTER FOR OTHER GENERAL EXAMINATION: ICD-10-CM

## 2023-03-21 DIAGNOSIS — R91.8 OTHER NONSPECIFIC ABNORMAL FINDING OF LUNG FIELD: ICD-10-CM

## 2023-03-21 DIAGNOSIS — R91.1 SOLITARY PULMONARY NODULE: ICD-10-CM

## 2023-03-21 PROCEDURE — 71250 CT THORAX DX C-: CPT

## 2023-03-21 PROCEDURE — 71250 CT THORAX DX C-: CPT | Mod: 26

## 2023-03-22 DIAGNOSIS — R91.1 SOLITARY PULMONARY NODULE: ICD-10-CM

## 2023-03-22 DIAGNOSIS — R91.8 OTHER NONSPECIFIC ABNORMAL FINDING OF LUNG FIELD: ICD-10-CM

## 2023-03-24 ENCOUNTER — APPOINTMENT (OUTPATIENT)
Dept: PULMONOLOGY | Facility: HOSPITAL | Age: 63
End: 2023-03-24
Payer: MEDICAID

## 2023-03-24 ENCOUNTER — OUTPATIENT (OUTPATIENT)
Dept: OUTPATIENT SERVICES | Facility: HOSPITAL | Age: 63
LOS: 1 days | End: 2023-03-24
Payer: MEDICAID

## 2023-03-24 DIAGNOSIS — R06.02 SHORTNESS OF BREATH: ICD-10-CM

## 2023-03-24 PROCEDURE — 94070 EVALUATION OF WHEEZING: CPT

## 2023-03-24 PROCEDURE — 94060 EVALUATION OF WHEEZING: CPT | Mod: 26

## 2023-03-24 PROCEDURE — 94727 GAS DIL/WSHOT DETER LNG VOL: CPT | Mod: 26

## 2023-03-24 PROCEDURE — 94729 DIFFUSING CAPACITY: CPT

## 2023-03-24 PROCEDURE — 94726 PLETHYSMOGRAPHY LUNG VOLUMES: CPT

## 2023-03-24 PROCEDURE — 94729 DIFFUSING CAPACITY: CPT | Mod: 26

## 2023-03-25 DIAGNOSIS — R06.02 SHORTNESS OF BREATH: ICD-10-CM

## 2023-05-15 ENCOUNTER — APPOINTMENT (OUTPATIENT)
Dept: PULMONOLOGY | Facility: CLINIC | Age: 63
End: 2023-05-15
Payer: MEDICARE

## 2023-05-15 VITALS
HEART RATE: 82 BPM | HEIGHT: 69 IN | WEIGHT: 162 LBS | OXYGEN SATURATION: 95 % | DIASTOLIC BLOOD PRESSURE: 70 MMHG | SYSTOLIC BLOOD PRESSURE: 110 MMHG | BODY MASS INDEX: 23.99 KG/M2

## 2023-05-15 DIAGNOSIS — J44.9 CHRONIC OBSTRUCTIVE PULMONARY DISEASE, UNSPECIFIED: ICD-10-CM

## 2023-05-15 DIAGNOSIS — R91.1 SOLITARY PULMONARY NODULE: ICD-10-CM

## 2023-05-15 DIAGNOSIS — J43.9 EMPHYSEMA, UNSPECIFIED: ICD-10-CM

## 2023-05-15 PROCEDURE — 99214 OFFICE O/P EST MOD 30 MIN: CPT

## 2023-05-15 NOTE — HISTORY OF PRESENT ILLNESS
[TextBox_4] : 62-year-old man with recent admission to the hospital for shortness of/COPD exacerbation, heavy smoker who recently quit about a month ago, history of HIV with I have a low viral level, hypertension, hyperlipidemia, CAD/PCI/stent in the past, currently presenting to the office for follow-up.  He is currently on Symbicort and Spiriva and feels his shortness of breath is better.  His lungs are clear today on exam.  O2 saturation is 96%.  During his hospitalization CT of the chest was done showing a pulmonary nodule measuring 6 mm.\par \par \par 5/15/23: COPD/emphysema, still smoking occasionally; PFTs with moderate obstruction, moderate DLCO reduction. He stopped the symbicort; rarely uses albuterol; lungs clear; will switch spiriva to incruse for insurance issues. CT chest for lung nodule follow up in 6 months.

## 2023-05-15 NOTE — PHYSICAL EXAM

## 2023-05-17 RX ORDER — UMECLIDINIUM 62.5 UG/1
62.5 AEROSOL, POWDER ORAL DAILY
Qty: 1 | Refills: 1 | Status: DISCONTINUED | COMMUNITY
Start: 2023-05-08 | End: 2023-05-17

## 2023-11-24 ENCOUNTER — OUTPATIENT (OUTPATIENT)
Dept: OUTPATIENT SERVICES | Facility: HOSPITAL | Age: 63
LOS: 1 days | End: 2023-11-24
Payer: MEDICARE

## 2023-11-24 ENCOUNTER — RESULT REVIEW (OUTPATIENT)
Age: 63
End: 2023-11-24

## 2023-11-24 DIAGNOSIS — R91.1 SOLITARY PULMONARY NODULE: ICD-10-CM

## 2023-11-24 PROCEDURE — 71271 CT THORAX LUNG CANCER SCR C-: CPT | Mod: 26

## 2023-11-24 PROCEDURE — 71271 CT THORAX LUNG CANCER SCR C-: CPT

## 2023-11-25 DIAGNOSIS — R91.1 SOLITARY PULMONARY NODULE: ICD-10-CM

## 2024-06-11 RX ORDER — UMECLIDINIUM BROMIDE AND VILANTEROL TRIFENATATE 62.5; 25 UG/1; UG/1
62.5-25 POWDER RESPIRATORY (INHALATION) DAILY
Qty: 1 | Refills: 3 | Status: ACTIVE | COMMUNITY
Start: 2023-05-15 | End: 1900-01-01

## 2024-11-12 ENCOUNTER — OUTPATIENT (OUTPATIENT)
Dept: OUTPATIENT SERVICES | Facility: HOSPITAL | Age: 64
LOS: 1 days | End: 2024-11-12
Payer: MEDICAID

## 2024-11-12 DIAGNOSIS — M79.601 PAIN IN RIGHT ARM: ICD-10-CM

## 2024-11-12 PROCEDURE — 73060 X-RAY EXAM OF HUMERUS: CPT | Mod: RT

## 2024-11-12 PROCEDURE — 73090 X-RAY EXAM OF FOREARM: CPT | Mod: RT

## 2024-11-12 PROCEDURE — 73090 X-RAY EXAM OF FOREARM: CPT | Mod: 26,RT

## 2024-11-12 PROCEDURE — 73060 X-RAY EXAM OF HUMERUS: CPT | Mod: 26,RT

## 2024-11-13 DIAGNOSIS — M79.601 PAIN IN RIGHT ARM: ICD-10-CM

## 2024-11-14 ENCOUNTER — RX RENEWAL (OUTPATIENT)
Age: 64
End: 2024-11-14

## 2025-02-28 ENCOUNTER — RX RENEWAL (OUTPATIENT)
Age: 65
End: 2025-02-28

## 2025-04-09 NOTE — ED ADULT NURSE NOTE - NS ED NURSE RECORD ANOTHER VITAL SIGN
Daily Note     Today's date: 2025  Patient name: Michael Hannah  : 2011  MRN: [id number]  Referring provider: Anna Ortiz PA-C  Dx:   Encounter Diagnosis     ICD-10-CM    1. Complex tear of medial meniscus of left knee as current injury, initial encounter  S83.232A              Subjective: Pt reported that he had his follow up with his surgeon who was content with his current level of progress, however he encouraged him to focus on knee extension ROM and quad strength.     Objective: See treatment diary below    Assessment: Tolerated treatment well. Advanced strengthening well without provocation of pain. Patient demonstrated fatigue post treatment, exhibited good technique with therapeutic exercises, and would benefit from continued PT    Plan: Continue per plan of care.      Precautions:   Past Medical History:   Diagnosis Date    Flat feet     Otitis media     Pneumonia     walking pneumonia- around 8 years old    Visual impairment     wears glasses     Meniscal Repair   Rehabilitation Guidelines      PHASE 1:                   Weeks 1-3 (3/6-3/27)     GENERAL GUIDELINES AND PRECAUTIONS:   ·       Brace locked only for sleeping and ambulation. Otherwise unlock brace 0-90. Remove for PT and hygiene.   (MESSAGED AND CONFIRMED NWB WITH [facility] 3/10/25)  ·       Partial weight bearing with crutches and brace locked in full extension   ·       ROM: 0-90   ·       Ice or cryotherapy, compressive wrap and elevation for control of pain and swelling   ·       Showering allowed on POD#3 after dressing removal. Leave steri-strips in place   ·       Driving allowed at 1 week for left leg and automatic transmission, 3 weeks for right leg or manual transmission   GOALS:   ·      Patient education about the nature of the surgery, associated precautions, and expected rehab progression   ·       Decrease pain, swelling and inflammation   ·       Avoid shear/stress on meniscus repair   ·       Maintain full  passive/active knee extension and hypertension   ·       Passive/active knee flexion to 90   ·       Regain active quadriceps control   ·       Restore full patellar mobility   EXERCISES:   ·      Quad sets, heel slides (0-90), prone hangs   ·       Patellar mobs in all 4 quadrants   ·       SLR in 4 planes (in brace until can perform without quad lag)   ·       Hamstring stretches / isometrics   ·       Gastrocnemius / soleus stretches and resistive ankle AROM / therabands   ·       Electrical stimulation for quad activation   CRITERA TO ADVANCE TO PHASE II:   ·       Knee ROM: 0-90   ·       Perform SLR with quad lag   ·       Normal patellar mobility   ·       Minimal swelling / inflammation                                                                                                        Meniscal Repair   PHASE 2:                   Weeks 3-6 Change brace to FULL ROM      GENERAL GUIDELINES, PRECAUTIONS, AND GOALS:   ·       Change brace to full flexion, brace should be unlocked at ALL times   ·       Wean crutches as tolerated   ·       Restore full knee ROM   ·       Progress lower extremity strength   ·       Normalize gait on all surfaces   ·       Eliminate inflammation and swelling   ·       Avoid stress on meniscal repair - limit pivoting and WB in flexion (squatting, stairs, rise from chair, etc.)   EXERCISES:   ·      Advance ROM   ·       Closed-chain quad strengthening 0-45 (wall sits, step-ups, mini squats, leg press, lunges, single leg squats)   ·       Progress hip, hamstring, calf strengthening   ·       Initiate proprioceptive exercises (single left balance, ball toss, balance beam, BOSU, Airex)   ·       Stationary bike (high seat, low resistance) Cross training machines for conditioning   ·       Aquatic exercises (if available)      CRITERIA TO ADVANCE TO PHASE III:      ·       Full knee ROM   ·       Good quadriceps strength with exercises   ·       Normal gait on all surfaces at  community level distances   ·       Minimal swelling / inflammation            PHASE 3:                   Weeks 6-12                                    Discontinue Brace week 5-6   GENERAL GUIDELINES, PRECAUTIONS, AND GOALS:   ·       D/C brace and normalize gait   ·       Progress strength, balance and aerobic endurance   ·       Continue to limit pivoting and WB with knee flexed >90 (squatting, stairs, rise from chair, etc.)      EXERCISES:   ·      Progress all Phase II exercises   ·       Progress closed-chain quad strengthening (0-70)   ·       Advance proprioceptive exercises (BOSU   ·       Continue strengthening - advance resistance and reps (hamstring curls, single leg press, core stabilization, etc)   CRITERIA TO ADVANCE TO PHASE IV:   ·       Full knee ROM   ·       Good quadriceps strength   ·       Normal gait   ·       Minimal swelling / inflammation                      Meniscal Repair   PHASE 4:                   Weeks 12-16   GENERAL GUIDELINES, PRECAUTIONS, AND GOALS:   ·       Increase strength to >85% non-involved extremity   ·       Progress strength, balance, and aerobic endurance   ·       Initiate return to running progression   EXERCISES:   ·      Progress all Phase III exercises   ·       Initiate plyometrics program   ·       Spin bike / Stairmaster   ·       Cybex training   ·       Pre-running exercises (low/high skips, punch steps, ander walks, kickbacks, step-overs, etc.)   ·       Light jogging in pool or on treadmill   ·       Agility drills (ladder, side shuffles, crossovers, backwards run, quick start/stops, zig-zags, cutting)   ·       Jump training (shuttle training, trampoline, landing technique, box jumps, single leg hops, tuck jumps)   CRITERIA TO ADVANCE TO PHASE V:   ·       Lower extremity strength greater than or equal to 85% of non-involved by Cybex test   ·       Single leg hip test greater or equal to 85% of non-involved   ·       No pain with forward running,  "agilities, jump training or strengthening   ·       Good knee control with single leg dynamic proprioceptive activities      PHASE 5:                   4 Months - 6 Months   GENERAL GUIDELINES, PRECAUTIONS, AND GOALS:   ·       Progress sport-specific and function rehab   ·       Progress lower extremity strength and aerobic endurance   ·       Full return to sport /  work activities   EXERCISES:   ·      Progress all Phase IV exercises   ·       Gradually increase level of participation in sports-specific activities   ·       Running / cutting / pivoting on all surfaces   CRITERIA FOR RETURN TO FULL ACTIVITY:   ·       Equal bilateral lower extremity strength, power & proprioception   ·       100% global function rating   ·       Clearance from MD         Manuals 3/10 3/13 3/17 3/19 3/24 4/2 4/7 4/9     Patellar mobs in all 4 quadrants             PROM knee ext/flexion  W/in protocol limits JZ W/in protocol limits JZ W/in protocol limits JZ W/in protocol limits JZ  JZ focus in extension                                Neuro Re-Ed 3/10 3/13 3/17 3/19 3/24 4/2 4/7 4/9     SLS       30\"x3   Foam 30\"x2 ea Foam 30\"x3 ea     SLS shallow squat       2x10 ea 2x10                                                                       Ther Ex 3/10 3/13 3/17 3/19 3/24 4/2 4/7 4/9     Quad sets seated  10\"x5 W/ NMES 10'  W/ NMES  20:10 sec off/off  20-25mA W/ NMES  20:10 sec off/off  20-25mA  10' W/ NMES  20:10 sec off/off  20-25mA  10' W/ NMES  20:10 sec off/off  20-25mA  10'       PKFS strap       30\"x3 30\"x3     Calf stretch step        30\" x3 ea     SLR flex   Brace 2x10 ea Brace 2x10  Brace 2x10 Brace 3x10  Brace 3x10     SLR abd   Brace  2x10 ea Brace 2x10   Brace 2x10  Brace 3x10  Brace 3x10     SLR add    Brace 3x5  Brace 2x10 Brace 3x10  Brace 3x10     Stand hip extension    L only 3x10   2x10 Prone 3x10 ea Prone 3x10     Hamstring stretches  :30x3 :30x3  :30x3      30\"x3     Seated hamstring isometric  Supine 5\"x10     " "                   prone hangs             lunges             PB wall sit        10\"2x5     U/l heel raise       2x10 ea 2x10     HEP education quad sets, knee ext stretch/HS stretch, heel slides  HEP added SLR flexion and abduction w/ brace 5'     5' added PB squat                  Ther Activity  3/13 3/17 3/19 3/24 4/2 4/7 4/9                  U/l LP (0-45 deg)      Seat13  15/ 3x10  V/c terminal knee ext (no pain) Seat13  15/ 10x  25/ 3x10  Seat12  35/ 3x10     Step up       2UEA  4\" unable due to pain and weakness   Unable due to quad weakness     U/l calf press       Seat13   25/ 3x10 Seat13  35/ 3x10 ea     Gait training      U/l crutch step to gait and step thru 1 lap - pain free and easy -              No AD 1 lap w/ CG - (noted soreness at 3/4 lap)                    Ice and elevation eos  10'                             " Yes